# Patient Record
Sex: MALE | Race: BLACK OR AFRICAN AMERICAN | NOT HISPANIC OR LATINO | Employment: FULL TIME | URBAN - METROPOLITAN AREA
[De-identification: names, ages, dates, MRNs, and addresses within clinical notes are randomized per-mention and may not be internally consistent; named-entity substitution may affect disease eponyms.]

---

## 2019-12-23 ENCOUNTER — HOSPITAL ENCOUNTER (EMERGENCY)
Facility: HOSPITAL | Age: 39
Discharge: HOME | End: 2019-12-23
Attending: EMERGENCY MEDICINE
Payer: MEDICAID

## 2019-12-23 VITALS
BODY MASS INDEX: 20.3 KG/M2 | SYSTOLIC BLOOD PRESSURE: 127 MMHG | HEIGHT: 71 IN | RESPIRATION RATE: 19 BRPM | TEMPERATURE: 98 F | HEART RATE: 80 BPM | WEIGHT: 145 LBS | DIASTOLIC BLOOD PRESSURE: 80 MMHG | OXYGEN SATURATION: 97 %

## 2019-12-23 DIAGNOSIS — R11.2 NAUSEA AND VOMITING: ICD-10-CM

## 2019-12-23 DIAGNOSIS — R11.2 NON-INTRACTABLE VOMITING WITH NAUSEA, UNSPECIFIED VOMITING TYPE: Primary | ICD-10-CM

## 2019-12-23 DIAGNOSIS — R10.84 GENERALIZED ABDOMINAL PAIN: ICD-10-CM

## 2019-12-23 LAB
ALBUMIN SERPL BCP-MCNC: 4.1 G/DL (ref 3.5–5.2)
ALP SERPL-CCNC: 98 U/L (ref 55–135)
ALT SERPL W/O P-5'-P-CCNC: 20 U/L (ref 10–44)
ANION GAP SERPL CALC-SCNC: 12 MMOL/L (ref 8–16)
AST SERPL-CCNC: 26 U/L (ref 10–40)
BACTERIA #/AREA URNS HPF: NORMAL /HPF
BASOPHILS # BLD AUTO: 0.05 K/UL (ref 0–0.2)
BASOPHILS NFR BLD: 0.6 % (ref 0–1.9)
BILIRUB SERPL-MCNC: 1.8 MG/DL (ref 0.1–1)
BILIRUB UR QL STRIP: NEGATIVE
BUN SERPL-MCNC: 8 MG/DL (ref 6–20)
CALCIUM SERPL-MCNC: 9.4 MG/DL (ref 8.7–10.5)
CHLORIDE SERPL-SCNC: 105 MMOL/L (ref 95–110)
CLARITY UR: CLEAR
CO2 SERPL-SCNC: 26 MMOL/L (ref 23–29)
COLOR UR: YELLOW
CREAT SERPL-MCNC: 0.8 MG/DL (ref 0.5–1.4)
DIFFERENTIAL METHOD: ABNORMAL
EOSINOPHIL # BLD AUTO: 0 K/UL (ref 0–0.5)
EOSINOPHIL NFR BLD: 0.2 % (ref 0–8)
ERYTHROCYTE [DISTWIDTH] IN BLOOD BY AUTOMATED COUNT: 12.4 % (ref 11.5–14.5)
EST. GFR  (AFRICAN AMERICAN): >60 ML/MIN/1.73 M^2
EST. GFR  (NON AFRICAN AMERICAN): >60 ML/MIN/1.73 M^2
GLUCOSE SERPL-MCNC: 84 MG/DL (ref 70–110)
GLUCOSE UR QL STRIP: NEGATIVE
HCT VFR BLD AUTO: 45.3 % (ref 40–54)
HGB BLD-MCNC: 15.2 G/DL (ref 14–18)
HGB UR QL STRIP: NEGATIVE
HYALINE CASTS #/AREA URNS LPF: NORMAL /LPF
IMM GRANULOCYTES # BLD AUTO: 0.02 K/UL (ref 0–0.04)
IMM GRANULOCYTES NFR BLD AUTO: 0.2 % (ref 0–0.5)
KETONES UR QL STRIP: ABNORMAL
LEUKOCYTE ESTERASE UR QL STRIP: NEGATIVE
LIPASE SERPL-CCNC: 23 U/L (ref 4–60)
LYMPHOCYTES # BLD AUTO: 2.7 K/UL (ref 1–4.8)
LYMPHOCYTES NFR BLD: 33.3 % (ref 18–48)
MCH RBC QN AUTO: 32.1 PG (ref 27–31)
MCHC RBC AUTO-ENTMCNC: 33.6 G/DL (ref 32–36)
MCV RBC AUTO: 96 FL (ref 82–98)
MICROSCOPIC COMMENT: NORMAL
MONOCYTES # BLD AUTO: 0.8 K/UL (ref 0.3–1)
MONOCYTES NFR BLD: 9.7 % (ref 4–15)
NEUTROPHILS # BLD AUTO: 4.5 K/UL (ref 1.8–7.7)
NEUTROPHILS NFR BLD: 56 % (ref 38–73)
NITRITE UR QL STRIP: NEGATIVE
NRBC BLD-RTO: 0 /100 WBC
PH UR STRIP: 6 [PH] (ref 5–8)
PLATELET # BLD AUTO: 344 K/UL (ref 150–350)
PMV BLD AUTO: 8.4 FL (ref 9.2–12.9)
POTASSIUM SERPL-SCNC: 3.6 MMOL/L (ref 3.5–5.1)
PROT SERPL-MCNC: 7.5 G/DL (ref 6–8.4)
PROT UR QL STRIP: ABNORMAL
RBC # BLD AUTO: 4.74 M/UL (ref 4.6–6.2)
RBC #/AREA URNS HPF: 2 /HPF (ref 0–4)
SODIUM SERPL-SCNC: 143 MMOL/L (ref 136–145)
SP GR UR STRIP: 1.02 (ref 1–1.03)
SQUAMOUS #/AREA URNS HPF: NORMAL /HPF
URN SPEC COLLECT METH UR: ABNORMAL
UROBILINOGEN UR STRIP-ACNC: ABNORMAL EU/DL
WBC # BLD AUTO: 8.07 K/UL (ref 3.9–12.7)
WBC #/AREA URNS HPF: 2 /HPF (ref 0–5)

## 2019-12-23 PROCEDURE — 93010 ELECTROCARDIOGRAM REPORT: CPT | Mod: ,,, | Performed by: INTERNAL MEDICINE

## 2019-12-23 PROCEDURE — 93010 EKG 12-LEAD: ICD-10-PCS | Mod: ,,, | Performed by: INTERNAL MEDICINE

## 2019-12-23 PROCEDURE — 81000 URINALYSIS NONAUTO W/SCOPE: CPT

## 2019-12-23 PROCEDURE — 93005 ELECTROCARDIOGRAM TRACING: CPT

## 2019-12-23 PROCEDURE — 96374 THER/PROPH/DIAG INJ IV PUSH: CPT

## 2019-12-23 PROCEDURE — 80053 COMPREHEN METABOLIC PANEL: CPT

## 2019-12-23 PROCEDURE — 96375 TX/PRO/DX INJ NEW DRUG ADDON: CPT

## 2019-12-23 PROCEDURE — 63600175 PHARM REV CODE 636 W HCPCS: Performed by: EMERGENCY MEDICINE

## 2019-12-23 PROCEDURE — 96361 HYDRATE IV INFUSION ADD-ON: CPT

## 2019-12-23 PROCEDURE — 83690 ASSAY OF LIPASE: CPT

## 2019-12-23 PROCEDURE — 99285 EMERGENCY DEPT VISIT HI MDM: CPT | Mod: 25

## 2019-12-23 PROCEDURE — 85025 COMPLETE CBC W/AUTO DIFF WBC: CPT

## 2019-12-23 RX ORDER — ONDANSETRON 4 MG/1
4 TABLET, FILM COATED ORAL EVERY 6 HOURS
Qty: 12 TABLET | Refills: 0 | OUTPATIENT
Start: 2019-12-23 | End: 2020-03-17

## 2019-12-23 RX ORDER — KETOROLAC TROMETHAMINE 30 MG/ML
15 INJECTION, SOLUTION INTRAMUSCULAR; INTRAVENOUS
Status: COMPLETED | OUTPATIENT
Start: 2019-12-23 | End: 2019-12-23

## 2019-12-23 RX ORDER — ONDANSETRON 2 MG/ML
4 INJECTION INTRAMUSCULAR; INTRAVENOUS
Status: COMPLETED | OUTPATIENT
Start: 2019-12-23 | End: 2019-12-23

## 2019-12-23 RX ADMIN — KETOROLAC TROMETHAMINE 15 MG: 30 INJECTION, SOLUTION INTRAMUSCULAR at 07:12

## 2019-12-23 RX ADMIN — SODIUM CHLORIDE 1000 ML: 0.9 INJECTION, SOLUTION INTRAVENOUS at 07:12

## 2019-12-23 RX ADMIN — ONDANSETRON HYDROCHLORIDE 4 MG: 2 SOLUTION INTRAMUSCULAR; INTRAVENOUS at 07:12

## 2019-12-24 NOTE — ED PROVIDER NOTES
Encounter Date: 12/23/2019    SCRIBE #1 NOTE: I, Antelmo Fountain , am scribing for, and in the presence of,  Joe Saldaña MD. I have scribed the following portions of the note - Other sections scribed: HPI, ROS, PE.       History     Chief Complaint   Patient presents with    Emesis     pt c/o N/V and generalized abd pain that started around 3 pm today; reports 10+ episodes of vomiting    Abdominal Pain     This is a 39 year old male who presents to the Emergency Department with a cc of emesis that began four hours pta.  Pt reports having over ten episodes of vomiting.  Pt reports associated nausea, shortness of breath, dizziness, and generalized abdominal pain.  He denies chest pain and fever.  Pt states that he recently was evaluated by a pulmonologist, but is unsure of what he was diagnosed with.  Pt notes that he did not take any medications to alleviate his symptoms.  He denies being in sick contact.  Pt also denies abdominal surgical hx.    The history is provided by the patient and a relative.     Review of patient's allergies indicates:  No Known Allergies  Past Medical History:   Diagnosis Date    Bronchitis     GERD (gastroesophageal reflux disease)     Irregular heart beat      No past surgical history on file.  No family history on file.  Social History     Tobacco Use    Smoking status: Current Every Day Smoker     Types: Cigarettes   Substance Use Topics    Alcohol use: Yes     Comment: socially     Drug use: Yes     Types: Marijuana     Review of Systems   Constitutional: Negative for fever.   HENT: Negative for congestion and rhinorrhea.    Eyes: Negative for redness.   Respiratory: Positive for shortness of breath.    Cardiovascular: Negative for chest pain.   Gastrointestinal: Positive for abdominal pain, nausea and vomiting.   Genitourinary: Negative for dysuria and hematuria.   Musculoskeletal: Negative for back pain and neck pain.   Skin: Negative for rash.   Neurological: Positive  for dizziness.   Psychiatric/Behavioral: Negative for confusion.       Physical Exam     Initial Vitals [12/23/19 1847]   BP Pulse Resp Temp SpO2   123/77 75 20 97.7 °F (36.5 °C) 97 %      MAP       --         Physical Exam    Nursing note and vitals reviewed.  Constitutional: He appears well-developed and well-nourished. He does not appear ill. He appears distressed.   HENT:   Head: Normocephalic and atraumatic.   Mouth/Throat: Oropharynx is clear and moist.   Eyes: EOM are normal. Pupils are equal, round, and reactive to light. No scleral icterus.   Neck: Normal range of motion. Neck supple. No JVD present.   Cardiovascular: Normal rate, regular rhythm, normal heart sounds and intact distal pulses. Exam reveals no gallop.    No murmur heard.  Pulmonary/Chest: Breath sounds normal. No respiratory distress. He has no wheezes. He has no rales.   Abdominal: Soft. Bowel sounds are normal. There is tenderness (mild, genralized ).   Musculoskeletal: Normal range of motion. He exhibits no tenderness.   Neurological: He is alert and oriented to person, place, and time. He has normal strength. GCS score is 15. GCS eye subscore is 4. GCS verbal subscore is 5. GCS motor subscore is 6.   Skin: Skin is warm and dry. Capillary refill takes less than 2 seconds. No rash noted. No erythema. No pallor.   Psychiatric: His behavior is normal. Judgment and thought content normal.         ED Course   Procedures  Labs Reviewed   CBC W/ AUTO DIFFERENTIAL - Abnormal; Notable for the following components:       Result Value    Mean Corpuscular Hemoglobin 32.1 (*)     MPV 8.4 (*)     All other components within normal limits   COMPREHENSIVE METABOLIC PANEL - Abnormal; Notable for the following components:    Total Bilirubin 1.8 (*)     All other components within normal limits   URINALYSIS, REFLEX TO URINE CULTURE - Abnormal; Notable for the following components:    Protein, UA 1+ (*)     Ketones, UA Trace (*)     Urobilinogen, UA 4.0-6.0  (*)     All other components within normal limits    Narrative:     Preferred Collection Type->Urine, Clean Catch   LIPASE   URINALYSIS MICROSCOPIC    Narrative:     Preferred Collection Type->Urine, Clean Catch     EKG Readings: (Independently Interpreted)   Initial Reading: No STEMI. Rhythm: Normal Sinus Rhythm. Heart Rate: 76. Ectopy: No Ectopy. Conduction: Normal. ST Segments: Normal ST Segments. Clinical Impression: Normal Sinus Rhythm     ECG Results          EKG 12-lead (In process)  Result time 12/24/19 05:50:48    In process by Interface, [facility] (12/24/19 05:50:48)                 Narrative:    Test Reason : R11.2,    Vent. Rate : 076 BPM     Atrial Rate : 076 BPM     P-R Int : 118 ms          QRS Dur : 106 ms      QT Int : 378 ms       P-R-T Axes : 076 085 044 degrees     QTc Int : 425 ms    Sinus rhythm with marked sinus arrythmia  Biatrial enlargement  LVH  Abnormal ECG  When compared with ECG of 04-DEC-2016 02:17,  Significant changes have occurred    Referred By: [email or web address]   SELF           Confirmed By:                   In process by Interface, [facility] (12/24/19 05:47:30)                 Narrative:    Test Reason : R11.2,    Vent. Rate : 076 BPM     Atrial Rate : 076 BPM     P-R Int : 118 ms          QRS Dur : 106 ms      QT Int : 378 ms       P-R-T Axes : 076 085 044 degrees     QTc Int : 425 ms    Sinus rhythm with marked sinus arrythmia  Biatrial enlargement  LVH  Abnormal ECG  When compared with ECG of 04-DEC-2016 02:17,  Significant changes have occurred    Referred By: [email or web address]   SELF           Confirmed By:                             Imaging Results          X-Ray Chest AP Portable (Final result)  Result time 12/23/19 19:33:44    Final result by Nitish Rogers MD (12/23/19 19:33:44)                 Impression:      No acute cardiopulmonary process identified.      Electronically signed by: Nitish Rogers MD  Date:    12/23/2019  Time:    19:33             Narrative:     EXAMINATION:  XR CHEST AP PORTABLE    CLINICAL HISTORY:  shortness of breath;    TECHNIQUE:  Single frontal view of the chest was performed.    COMPARISON:  April 2014.    FINDINGS:  Cardiac silhouette is normal in size.  Lungs are symmetrically expanded.  No evidence of focal consolidative process, pneumothorax, or significant effusion.  No acute osseous abnormality identified.                                 Medical Decision Making:   Initial Assessment:   Dillon Wilhelm III is a 39 y.o. male presenting for an emergent evaluation of emesis and abdominal pain that began four hours pta. Exam revealed generalized abdominal pain. Will obtain labs, and given the extent of pain, imaging. I will treat the pt's symptoms appropriately and reassess.  Clinical Tests:   Lab Tests: Ordered and Reviewed  Radiological Study: Reviewed and Ordered  Medical Tests: Ordered and Reviewed            Scribe Attestation:   Scribe #1: I performed the above scribed service and the documentation accurately describes the services I performed. I attest to the accuracy of the note.      Pt arrived alert, afebrile, non-toxic in appearance, in no acute respiratory distress with VSS.  EKG revealed no acute findings concerning for ischemia, arrhythmia, heart block or any pathology to warrant cardiology consultation.  Abdomen minimally tender with no findings to warrant imaging at this time.  Labs showed no acute findings.  Sx's resolved with meds.  Abdomen soft and non-tender upon reassessment and the patient tolerated PO intake w/o difficulty.  Sx's likely secondary to gastritis vs gastroenteritis vs cyclical vomiting.  Pt discharged and counseled on the need to return to the nearest emergency room if they experience any other concerning symptoms.  Pt counseled to F/U outpatient with a PCP over the next two to three days.    Joe Saldaña MD                            Clinical Impression:       ICD-10-CM ICD-9-CM   1. Non-intractable vomiting  with nausea, unspecified vomiting type R11.2 787.01   2. Nausea and vomiting R11.2 787.01   3. Generalized abdominal pain R10.84 789.07             I, Joe Saldaña, personally performed the services described in this documentation. All medical record entries made by the scribe were at my direction and in my presence.  I have reviewed the chart and agree that the record reflects my personal performance and is accurate and complete.                 Joe Saldaña MD  12/24/19 3673

## 2019-12-24 NOTE — ED TRIAGE NOTES
Pt arrived to the ED due to n/v and generalized abdominal pain that started today. Pt denies SOB/fever/chills

## 2020-03-17 ENCOUNTER — HOSPITAL ENCOUNTER (EMERGENCY)
Facility: HOSPITAL | Age: 40
Discharge: HOME | End: 2020-03-17
Attending: EMERGENCY MEDICINE
Payer: MEDICAID

## 2020-03-17 VITALS
RESPIRATION RATE: 18 BRPM | HEIGHT: 69 IN | SYSTOLIC BLOOD PRESSURE: 129 MMHG | BODY MASS INDEX: 22.51 KG/M2 | DIASTOLIC BLOOD PRESSURE: 87 MMHG | TEMPERATURE: 99 F | HEART RATE: 66 BPM | OXYGEN SATURATION: 98 % | WEIGHT: 152 LBS

## 2020-03-17 DIAGNOSIS — J06.9 VIRAL URI WITH COUGH: Primary | ICD-10-CM

## 2020-03-17 LAB
ANION GAP SERPL CALC-SCNC: 12 MMOL/L (ref 8–16)
BUN SERPL-MCNC: 8 MG/DL (ref 6–30)
CHLORIDE SERPL-SCNC: 101 MMOL/L (ref 95–110)
CREAT SERPL-MCNC: 0.8 MG/DL (ref 0.5–1.4)
CTP QC/QA: YES
GLUCOSE SERPL-MCNC: 102 MG/DL (ref 70–110)
HCT VFR BLD CALC: 45 %PCV (ref 36–54)
POC IONIZED CALCIUM: 1.15 MMOL/L (ref 1.06–1.42)
POC MOLECULAR INFLUENZA A AGN: NEGATIVE
POC MOLECULAR INFLUENZA B AGN: NEGATIVE
POC TCO2 (MEASURED): 31 MMOL/L (ref 23–29)
POTASSIUM BLD-SCNC: 4.6 MMOL/L (ref 3.5–5.1)
SAMPLE: ABNORMAL
SODIUM BLD-SCNC: 138 MMOL/L (ref 136–145)

## 2020-03-17 PROCEDURE — 84295 ASSAY OF SERUM SODIUM: CPT

## 2020-03-17 PROCEDURE — 63600175 PHARM REV CODE 636 W HCPCS: Performed by: EMERGENCY MEDICINE

## 2020-03-17 PROCEDURE — 85014 HEMATOCRIT: CPT

## 2020-03-17 PROCEDURE — 84132 ASSAY OF SERUM POTASSIUM: CPT

## 2020-03-17 PROCEDURE — 82330 ASSAY OF CALCIUM: CPT

## 2020-03-17 PROCEDURE — 25000003 PHARM REV CODE 250: Performed by: EMERGENCY MEDICINE

## 2020-03-17 PROCEDURE — 96360 HYDRATION IV INFUSION INIT: CPT

## 2020-03-17 PROCEDURE — 87502 INFLUENZA DNA AMP PROBE: CPT

## 2020-03-17 PROCEDURE — 99284 EMERGENCY DEPT VISIT MOD MDM: CPT | Mod: 25

## 2020-03-17 PROCEDURE — 82565 ASSAY OF CREATININE: CPT

## 2020-03-17 PROCEDURE — 99900035 HC TECH TIME PER 15 MIN (STAT)

## 2020-03-17 RX ORDER — ONDANSETRON 4 MG/1
4 TABLET, ORALLY DISINTEGRATING ORAL
Status: COMPLETED | OUTPATIENT
Start: 2020-03-17 | End: 2020-03-17

## 2020-03-17 RX ORDER — BENZONATATE 100 MG/1
100 CAPSULE ORAL 3 TIMES DAILY PRN
Qty: 20 CAPSULE | Refills: 0 | Status: SHIPPED | OUTPATIENT
Start: 2020-03-17 | End: 2020-03-27

## 2020-03-17 RX ORDER — ONDANSETRON 4 MG/1
4 TABLET, FILM COATED ORAL EVERY 6 HOURS
Qty: 12 TABLET | Refills: 0 | Status: SHIPPED | OUTPATIENT
Start: 2020-03-17 | End: 2021-08-19 | Stop reason: HOSPADM

## 2020-03-17 RX ORDER — BENZONATATE 100 MG/1
100 CAPSULE ORAL 3 TIMES DAILY PRN
Status: DISCONTINUED | OUTPATIENT
Start: 2020-03-17 | End: 2020-03-17 | Stop reason: HOSPADM

## 2020-03-17 RX ADMIN — ONDANSETRON 4 MG: 4 TABLET, ORALLY DISINTEGRATING ORAL at 10:03

## 2020-03-17 RX ADMIN — BENZONATATE 100 MG: 100 CAPSULE ORAL at 10:03

## 2020-03-17 RX ADMIN — SODIUM CHLORIDE 1000 ML: 0.9 INJECTION, SOLUTION INTRAVENOUS at 10:03

## 2020-03-17 NOTE — ED PROVIDER NOTES
Encounter Date: 3/17/2020       History     Chief Complaint   Patient presents with    Cough     Pt to ER with c/o cough, nasal drip, abd cramps and chest discomfort while coughing x 2 days     HPI   39-year-old male with a history of bronchitis, GERD presents the ED with 5 days of rhinorrhea, cough, nausea vomiting.  He has not been able to really keep much food down since last Fridays.  He works at the [employer] center feels that he might have been exposed to the COVID19 virus.  No shortness of breath or fevers noted.      Review of patient's allergies indicates:  No Known Allergies  Past Medical History:   Diagnosis Date    Bronchitis     GERD (gastroesophageal reflux disease)     Irregular heart beat      History reviewed. No pertinent surgical history.  No family history on file.  Social History     Tobacco Use    Smoking status: Current Every Day Smoker     Types: Cigarettes   Substance Use Topics    Alcohol use: Yes     Comment: socially     Drug use: Yes     Types: Marijuana     Review of Systems   Constitutional: Negative for fever.   HENT: Negative for sore throat.    Respiratory: Positive for cough. Negative for chest tightness, shortness of breath, wheezing and stridor.    Cardiovascular: Negative for chest pain.   Gastrointestinal: Positive for nausea and vomiting. Negative for diarrhea.   Genitourinary: Negative for dysuria.   Musculoskeletal: Negative for back pain.   Skin: Negative for rash.   Neurological: Negative for weakness.   Hematological: Does not bruise/bleed easily.       Physical Exam     Initial Vitals [03/17/20 0938]   BP Pulse Resp Temp SpO2   121/85 64 18 98.5 °F (36.9 °C) 96 %      MAP       --         Physical Exam    Constitutional: He appears well-nourished.   HENT:   Head: Normocephalic.   Eyes: Conjunctivae are normal. No scleral icterus.   Neck: Normal range of motion. Neck supple.   Cardiovascular: Normal heart sounds and intact distal pulses.   No murmur  heard.  Pulmonary/Chest: Breath sounds normal. No respiratory distress. He has no wheezes. He has no rhonchi. He has no rales.   Abdominal: Soft. He exhibits no distension. There is no tenderness.   Musculoskeletal: Normal range of motion. He exhibits no edema.   Neurological: He is alert and oriented to person, place, and time.   Skin: Skin is warm and dry.   Psychiatric: He has a normal mood and affect. His behavior is normal. Judgment and thought content normal.         ED Course   Procedures  Labs Reviewed   ISTAT PROCEDURE - Abnormal; Notable for the following components:       Result Value    POC TCO2 (MEASURED) 31 (*)     All other components within normal limits   POCT INFLUENZA A/B MOLECULAR       [age]-year-old male with likely viral illness with GI and upper respiratory component.  He has no shortness of breath or difficulty breathing.  Zofran and Tessalon ordered but just after receiving medication patient vomited.  Chem 8, Zofran and fluids added.  Patient's labs were overall reassuring.  He feels better after fluids.  I have counseled him to aggressively hydrate and come back with any worsening of condition, increased shortness of breath, or any other concerns    Imaging Results    None                                          Clinical Impression:       ICD-10-CM ICD-9-CM   1. Viral URI with cough J06.9 465.9    B97.89              ED Disposition Condition    Discharge Stable        ED Prescriptions     Medication Sig Dispense Start Date End Date Auth. Provider    ondansetron (ZOFRAN) 4 MG tablet Take 1 tablet (4 mg total) by mouth every 6 (six) hours. 12 tablet 3/17/2020  Yanira Pimentel MD    benzonatate (TESSALON) 100 MG capsule Take 1 capsule (100 mg total) by mouth 3 (three) times daily as needed for Cough. 20 capsule 3/17/2020 3/27/2020 Yanira Pimentel MD        Follow-up Information     Follow up With Specialties Details Why Contact Info    [facility] [place]  In 1 week To  establish care [address] [place] [address]  [phone]      [facility] Go to  As needed, if symptoms worsen, or any other concerns [address] [facility] [address] [phone]                                     Yanira Pimentel MD  03/25/20 2117

## 2020-03-18 ENCOUNTER — PES CALL (OUTPATIENT)
Dept: ADMINISTRATIVE | Facility: CLINIC | Age: 40
End: 2020-03-18

## 2020-10-04 ENCOUNTER — HOSPITAL ENCOUNTER (EMERGENCY)
Facility: OTHER | Age: 40
Discharge: ANOTHER HOSPITAL | End: 2020-10-04
Attending: EMERGENCY MEDICINE
Payer: MEDICAID

## 2020-10-04 VITALS
TEMPERATURE: 98 F | WEIGHT: 150 LBS | RESPIRATION RATE: 16 BRPM | DIASTOLIC BLOOD PRESSURE: 67 MMHG | BODY MASS INDEX: 22.15 KG/M2 | OXYGEN SATURATION: 99 % | HEART RATE: 83 BPM | SYSTOLIC BLOOD PRESSURE: 142 MMHG

## 2020-10-04 DIAGNOSIS — S02.609B OPEN FRACTURE OF MANDIBLE, UNSPECIFIED LATERALITY, UNSPECIFIED MANDIBULAR SITE, INITIAL ENCOUNTER: Primary | ICD-10-CM

## 2020-10-04 LAB
HCV AB SERPL QL IA: NEGATIVE
HIV 1+2 AB+HIV1 P24 AG SERPL QL IA: NEGATIVE

## 2020-10-04 PROCEDURE — 99284 EMERGENCY DEPT VISIT MOD MDM: CPT | Mod: 25

## 2020-10-04 PROCEDURE — 86703 HIV-1/HIV-2 1 RESULT ANTBDY: CPT

## 2020-10-04 PROCEDURE — 86803 HEPATITIS C AB TEST: CPT

## 2020-10-04 PROCEDURE — 96375 TX/PRO/DX INJ NEW DRUG ADDON: CPT

## 2020-10-04 PROCEDURE — 96365 THER/PROPH/DIAG IV INF INIT: CPT

## 2020-10-04 PROCEDURE — 25000003 PHARM REV CODE 250: Performed by: PHYSICIAN ASSISTANT

## 2020-10-04 PROCEDURE — 63600175 PHARM REV CODE 636 W HCPCS: Performed by: PHYSICIAN ASSISTANT

## 2020-10-04 RX ORDER — CLINDAMYCIN PHOSPHATE 600 MG/50ML
600 INJECTION, SOLUTION INTRAVENOUS
Status: COMPLETED | OUTPATIENT
Start: 2020-10-04 | End: 2020-10-04

## 2020-10-04 RX ORDER — MORPHINE SULFATE 4 MG/ML
4 INJECTION, SOLUTION INTRAMUSCULAR; INTRAVENOUS
Status: COMPLETED | OUTPATIENT
Start: 2020-10-04 | End: 2020-10-04

## 2020-10-04 RX ORDER — FENTANYL CITRATE 50 UG/ML
75 INJECTION, SOLUTION INTRAMUSCULAR; INTRAVENOUS
Status: COMPLETED | OUTPATIENT
Start: 2020-10-04 | End: 2020-10-04

## 2020-10-04 RX ADMIN — FENTANYL CITRATE 75 MCG: 50 INJECTION INTRAMUSCULAR; INTRAVENOUS at 12:10

## 2020-10-04 RX ADMIN — CLINDAMYCIN IN 5 PERCENT DEXTROSE 600 MG: 12 INJECTION, SOLUTION INTRAVENOUS at 11:10

## 2020-10-04 RX ADMIN — MORPHINE SULFATE 4 MG: 4 INJECTION, SOLUTION INTRAMUSCULAR; INTRAVENOUS at 11:10

## 2020-10-04 NOTE — ED NOTES
Patient resting comfortably in bed.  Patient in NAD.  Respirations even, unlabored. Patient on continuous BP cuff, and pulse oximeter.  Patient denies needs/complaints at this time.
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
## 2020-10-04 NOTE — ED NOTES
Spoke with transfer center.  Patient will be transferred to [facility].  Providing MD is MD Bernard.  Phone number for report is [phone].  Need to send copy of chart and copy of images.
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
## 2021-04-08 ENCOUNTER — HOSPITAL ENCOUNTER (EMERGENCY)
Facility: OTHER | Age: 41
Discharge: HOME | End: 2021-04-08
Attending: EMERGENCY MEDICINE
Payer: MEDICAID

## 2021-04-08 VITALS
OXYGEN SATURATION: 97 % | DIASTOLIC BLOOD PRESSURE: 84 MMHG | RESPIRATION RATE: 18 BRPM | TEMPERATURE: 98 F | SYSTOLIC BLOOD PRESSURE: 123 MMHG | HEART RATE: 75 BPM

## 2021-04-08 DIAGNOSIS — S69.92XA WRIST INJURY, LEFT, INITIAL ENCOUNTER: ICD-10-CM

## 2021-04-08 DIAGNOSIS — S63.502A WRIST SPRAIN, LEFT, INITIAL ENCOUNTER: Primary | ICD-10-CM

## 2021-04-08 LAB
HCV AB SERPL QL IA: NEGATIVE
HIV 1+2 AB+HIV1 P24 AG SERPL QL IA: NEGATIVE

## 2021-04-08 PROCEDURE — 86803 HEPATITIS C AB TEST: CPT | Performed by: EMERGENCY MEDICINE

## 2021-04-08 PROCEDURE — 86703 HIV-1/HIV-2 1 RESULT ANTBDY: CPT | Performed by: EMERGENCY MEDICINE

## 2021-04-08 PROCEDURE — 25000003 PHARM REV CODE 250: Performed by: EMERGENCY MEDICINE

## 2021-04-08 PROCEDURE — 29125 APPL SHORT ARM SPLINT STATIC: CPT | Mod: LT

## 2021-04-08 PROCEDURE — 99284 EMERGENCY DEPT VISIT MOD MDM: CPT | Mod: 25

## 2021-04-08 RX ORDER — IBUPROFEN 600 MG/1
600 TABLET ORAL EVERY 6 HOURS PRN
Qty: 20 TABLET | Refills: 0 | Status: SHIPPED | OUTPATIENT
Start: 2021-04-08 | End: 2021-08-19 | Stop reason: SDUPTHER

## 2021-04-08 RX ORDER — IBUPROFEN 400 MG/1
800 TABLET ORAL
Status: COMPLETED | OUTPATIENT
Start: 2021-04-08 | End: 2021-04-08

## 2021-04-08 RX ADMIN — IBUPROFEN 800 MG: 400 TABLET, FILM COATED ORAL at 08:04

## 2021-04-26 ENCOUNTER — PATIENT MESSAGE (OUTPATIENT)
Dept: RESEARCH | Facility: HOSPITAL | Age: 41
End: 2021-04-26

## 2021-08-19 ENCOUNTER — HOSPITAL ENCOUNTER (EMERGENCY)
Facility: OTHER | Age: 41
Discharge: HOME | End: 2021-08-19
Attending: EMERGENCY MEDICINE
Payer: MEDICAID

## 2021-08-19 VITALS
BODY MASS INDEX: 21.7 KG/M2 | SYSTOLIC BLOOD PRESSURE: 132 MMHG | WEIGHT: 155 LBS | TEMPERATURE: 98 F | DIASTOLIC BLOOD PRESSURE: 82 MMHG | HEART RATE: 108 BPM | OXYGEN SATURATION: 96 % | HEIGHT: 71 IN | RESPIRATION RATE: 16 BRPM

## 2021-08-19 DIAGNOSIS — R07.9 CHEST PAIN: ICD-10-CM

## 2021-08-19 DIAGNOSIS — R07.89 CHEST WALL PAIN: Primary | ICD-10-CM

## 2021-08-19 LAB
ALBUMIN SERPL BCP-MCNC: 4 G/DL (ref 3.5–5.2)
ALP SERPL-CCNC: 92 U/L (ref 55–135)
ALT SERPL W/O P-5'-P-CCNC: 25 U/L (ref 10–44)
ANION GAP SERPL CALC-SCNC: 12 MMOL/L (ref 8–16)
AST SERPL-CCNC: 44 U/L (ref 10–40)
BASOPHILS # BLD AUTO: 0.06 K/UL (ref 0–0.2)
BASOPHILS NFR BLD: 0.7 % (ref 0–1.9)
BILIRUB SERPL-MCNC: 1.1 MG/DL (ref 0.1–1)
BUN SERPL-MCNC: 10 MG/DL (ref 6–20)
CALCIUM SERPL-MCNC: 8.8 MG/DL (ref 8.7–10.5)
CHLORIDE SERPL-SCNC: 106 MMOL/L (ref 95–110)
CO2 SERPL-SCNC: 22 MMOL/L (ref 23–29)
CREAT SERPL-MCNC: 0.8 MG/DL (ref 0.5–1.4)
CTP QC/QA: YES
D DIMER PPP IA.FEU-MCNC: 0.27 MG/L FEU
DIFFERENTIAL METHOD: ABNORMAL
EOSINOPHIL # BLD AUTO: 0.1 K/UL (ref 0–0.5)
EOSINOPHIL NFR BLD: 0.6 % (ref 0–8)
ERYTHROCYTE [DISTWIDTH] IN BLOOD BY AUTOMATED COUNT: 11.9 % (ref 11.5–14.5)
EST. GFR  (AFRICAN AMERICAN): >60 ML/MIN/1.73 M^2
EST. GFR  (NON AFRICAN AMERICAN): >60 ML/MIN/1.73 M^2
GLUCOSE SERPL-MCNC: 75 MG/DL (ref 70–110)
HCT VFR BLD AUTO: 39.4 % (ref 40–54)
HGB BLD-MCNC: 13.4 G/DL (ref 14–18)
IMM GRANULOCYTES # BLD AUTO: 0.01 K/UL (ref 0–0.04)
IMM GRANULOCYTES NFR BLD AUTO: 0.1 % (ref 0–0.5)
LYMPHOCYTES # BLD AUTO: 2.5 K/UL (ref 1–4.8)
LYMPHOCYTES NFR BLD: 30 % (ref 18–48)
MCH RBC QN AUTO: 33.8 PG (ref 27–31)
MCHC RBC AUTO-ENTMCNC: 34 G/DL (ref 32–36)
MCV RBC AUTO: 99 FL (ref 82–98)
MONOCYTES # BLD AUTO: 0.8 K/UL (ref 0.3–1)
MONOCYTES NFR BLD: 9.6 % (ref 4–15)
NEUTROPHILS # BLD AUTO: 5 K/UL (ref 1.8–7.7)
NEUTROPHILS NFR BLD: 59 % (ref 38–73)
NRBC BLD-RTO: 0 /100 WBC
PLATELET # BLD AUTO: 228 K/UL (ref 150–450)
PMV BLD AUTO: 8.4 FL (ref 9.2–12.9)
POCT GLUCOSE: 71 MG/DL (ref 70–110)
POTASSIUM SERPL-SCNC: 4.1 MMOL/L (ref 3.5–5.1)
PROT SERPL-MCNC: 7.7 G/DL (ref 6–8.4)
RBC # BLD AUTO: 3.97 M/UL (ref 4.6–6.2)
SARS-COV-2 RDRP RESP QL NAA+PROBE: NEGATIVE
SODIUM SERPL-SCNC: 140 MMOL/L (ref 136–145)
TROPONIN I SERPL DL<=0.01 NG/ML-MCNC: <0.006 NG/ML (ref 0–0.03)
WBC # BLD AUTO: 8.47 K/UL (ref 3.9–12.7)

## 2021-08-19 PROCEDURE — U0002 COVID-19 LAB TEST NON-CDC: HCPCS | Performed by: EMERGENCY MEDICINE

## 2021-08-19 PROCEDURE — 82962 GLUCOSE BLOOD TEST: CPT

## 2021-08-19 PROCEDURE — 63600175 PHARM REV CODE 636 W HCPCS: Performed by: EMERGENCY MEDICINE

## 2021-08-19 PROCEDURE — 80053 COMPREHEN METABOLIC PANEL: CPT | Performed by: EMERGENCY MEDICINE

## 2021-08-19 PROCEDURE — 84484 ASSAY OF TROPONIN QUANT: CPT | Performed by: EMERGENCY MEDICINE

## 2021-08-19 PROCEDURE — 99285 EMERGENCY DEPT VISIT HI MDM: CPT | Mod: 25

## 2021-08-19 PROCEDURE — 93005 ELECTROCARDIOGRAM TRACING: CPT

## 2021-08-19 PROCEDURE — 96374 THER/PROPH/DIAG INJ IV PUSH: CPT

## 2021-08-19 PROCEDURE — 93010 EKG 12-LEAD: ICD-10-PCS | Mod: ,,, | Performed by: INTERNAL MEDICINE

## 2021-08-19 PROCEDURE — 85025 COMPLETE CBC W/AUTO DIFF WBC: CPT | Performed by: EMERGENCY MEDICINE

## 2021-08-19 PROCEDURE — 85379 FIBRIN DEGRADATION QUANT: CPT | Performed by: EMERGENCY MEDICINE

## 2021-08-19 PROCEDURE — 93010 ELECTROCARDIOGRAM REPORT: CPT | Mod: ,,, | Performed by: INTERNAL MEDICINE

## 2021-08-19 PROCEDURE — 25000003 PHARM REV CODE 250: Performed by: EMERGENCY MEDICINE

## 2021-08-19 PROCEDURE — 96361 HYDRATE IV INFUSION ADD-ON: CPT

## 2021-08-19 RX ORDER — KETOROLAC TROMETHAMINE 30 MG/ML
15 INJECTION, SOLUTION INTRAMUSCULAR; INTRAVENOUS
Status: COMPLETED | OUTPATIENT
Start: 2021-08-19 | End: 2021-08-19

## 2021-08-19 RX ORDER — IBUPROFEN 600 MG/1
600 TABLET ORAL EVERY 6 HOURS PRN
Qty: 25 TABLET | Refills: 0 | OUTPATIENT
Start: 2021-08-19 | End: 2022-01-09

## 2021-08-19 RX ADMIN — KETOROLAC TROMETHAMINE 15 MG: 30 INJECTION, SOLUTION INTRAMUSCULAR; INTRAVENOUS at 08:08

## 2021-08-19 RX ADMIN — SODIUM CHLORIDE 1000 ML: 0.9 INJECTION, SOLUTION INTRAVENOUS at 08:08

## 2022-01-09 ENCOUNTER — HOSPITAL ENCOUNTER (EMERGENCY)
Facility: OTHER | Age: 42
Discharge: HOME | End: 2022-01-09
Attending: EMERGENCY MEDICINE
Payer: MEDICAID

## 2022-01-09 VITALS
OXYGEN SATURATION: 96 % | TEMPERATURE: 99 F | SYSTOLIC BLOOD PRESSURE: 121 MMHG | DIASTOLIC BLOOD PRESSURE: 74 MMHG | HEART RATE: 90 BPM | RESPIRATION RATE: 16 BRPM | HEIGHT: 71 IN | WEIGHT: 150 LBS | BODY MASS INDEX: 21 KG/M2

## 2022-01-09 DIAGNOSIS — S16.1XXA ACUTE STRAIN OF NECK MUSCLE, INITIAL ENCOUNTER: ICD-10-CM

## 2022-01-09 DIAGNOSIS — R11.2 NAUSEA AND VOMITING, INTRACTABILITY OF VOMITING NOT SPECIFIED, UNSPECIFIED VOMITING TYPE: Primary | ICD-10-CM

## 2022-01-09 DIAGNOSIS — B34.9 VIRAL SYNDROME: ICD-10-CM

## 2022-01-09 LAB
ALBUMIN SERPL BCP-MCNC: 4.1 G/DL (ref 3.5–5.2)
ALP SERPL-CCNC: 91 U/L (ref 55–135)
ALT SERPL W/O P-5'-P-CCNC: 23 U/L (ref 10–44)
ANION GAP SERPL CALC-SCNC: 20 MMOL/L (ref 8–16)
AST SERPL-CCNC: 43 U/L (ref 10–40)
BASOPHILS # BLD AUTO: 0.06 K/UL (ref 0–0.2)
BASOPHILS NFR BLD: 0.8 % (ref 0–1.9)
BILIRUB SERPL-MCNC: 1.1 MG/DL (ref 0.1–1)
BUN SERPL-MCNC: 13 MG/DL (ref 6–20)
CALCIUM SERPL-MCNC: 8.9 MG/DL (ref 8.7–10.5)
CHLORIDE SERPL-SCNC: 105 MMOL/L (ref 95–110)
CO2 SERPL-SCNC: 16 MMOL/L (ref 23–29)
CREAT SERPL-MCNC: 0.7 MG/DL (ref 0.5–1.4)
CTP QC/QA: YES
CTP QC/QA: YES
DIFFERENTIAL METHOD: ABNORMAL
EOSINOPHIL # BLD AUTO: 0 K/UL (ref 0–0.5)
EOSINOPHIL NFR BLD: 0.4 % (ref 0–8)
ERYTHROCYTE [DISTWIDTH] IN BLOOD BY AUTOMATED COUNT: 12 % (ref 11.5–14.5)
EST. GFR  (AFRICAN AMERICAN): >60 ML/MIN/1.73 M^2
EST. GFR  (NON AFRICAN AMERICAN): >60 ML/MIN/1.73 M^2
GLUCOSE SERPL-MCNC: 50 MG/DL (ref 70–110)
HCT VFR BLD AUTO: 41.8 % (ref 40–54)
HGB BLD-MCNC: 14 G/DL (ref 14–18)
IMM GRANULOCYTES # BLD AUTO: 0.02 K/UL (ref 0–0.04)
IMM GRANULOCYTES NFR BLD AUTO: 0.3 % (ref 0–0.5)
LYMPHOCYTES # BLD AUTO: 1.5 K/UL (ref 1–4.8)
LYMPHOCYTES NFR BLD: 19.4 % (ref 18–48)
MCH RBC QN AUTO: 33.7 PG (ref 27–31)
MCHC RBC AUTO-ENTMCNC: 33.5 G/DL (ref 32–36)
MCV RBC AUTO: 101 FL (ref 82–98)
MONOCYTES # BLD AUTO: 0.7 K/UL (ref 0.3–1)
MONOCYTES NFR BLD: 9.1 % (ref 4–15)
NEUTROPHILS # BLD AUTO: 5.6 K/UL (ref 1.8–7.7)
NEUTROPHILS NFR BLD: 70 % (ref 38–73)
NRBC BLD-RTO: 0 /100 WBC
PLATELET # BLD AUTO: 279 K/UL (ref 150–450)
PMV BLD AUTO: 8.6 FL (ref 9.2–12.9)
POC MOLECULAR INFLUENZA A AGN: NEGATIVE
POC MOLECULAR INFLUENZA B AGN: NEGATIVE
POTASSIUM SERPL-SCNC: 3.7 MMOL/L (ref 3.5–5.1)
PROT SERPL-MCNC: 7.3 G/DL (ref 6–8.4)
RBC # BLD AUTO: 4.15 M/UL (ref 4.6–6.2)
SARS-COV-2 RDRP RESP QL NAA+PROBE: NEGATIVE
SODIUM SERPL-SCNC: 141 MMOL/L (ref 136–145)
WBC # BLD AUTO: 7.94 K/UL (ref 3.9–12.7)

## 2022-01-09 PROCEDURE — 85025 COMPLETE CBC W/AUTO DIFF WBC: CPT | Performed by: NURSE PRACTITIONER

## 2022-01-09 PROCEDURE — 99284 PR EMERGENCY DEPT VISIT,LEVEL IV: ICD-10-PCS | Mod: CS,,, | Performed by: NURSE PRACTITIONER

## 2022-01-09 PROCEDURE — 80053 COMPREHEN METABOLIC PANEL: CPT | Performed by: NURSE PRACTITIONER

## 2022-01-09 PROCEDURE — 99284 EMERGENCY DEPT VISIT MOD MDM: CPT | Mod: 25

## 2022-01-09 PROCEDURE — 63600175 PHARM REV CODE 636 W HCPCS: Performed by: EMERGENCY MEDICINE

## 2022-01-09 PROCEDURE — 25000003 PHARM REV CODE 250: Performed by: NURSE PRACTITIONER

## 2022-01-09 PROCEDURE — U0002 COVID-19 LAB TEST NON-CDC: HCPCS | Performed by: NURSE PRACTITIONER

## 2022-01-09 PROCEDURE — 96374 THER/PROPH/DIAG INJ IV PUSH: CPT

## 2022-01-09 PROCEDURE — 99284 EMERGENCY DEPT VISIT MOD MDM: CPT | Mod: CS,,, | Performed by: NURSE PRACTITIONER

## 2022-01-09 PROCEDURE — 96361 HYDRATE IV INFUSION ADD-ON: CPT

## 2022-01-09 RX ORDER — BENZONATATE 100 MG/1
100 CAPSULE ORAL 3 TIMES DAILY PRN
Qty: 30 CAPSULE | Refills: 0 | Status: SHIPPED | OUTPATIENT
Start: 2022-01-09 | End: 2022-01-19

## 2022-01-09 RX ORDER — NAPROXEN 500 MG/1
500 TABLET ORAL 2 TIMES DAILY WITH MEALS
Qty: 20 TABLET | Refills: 0 | OUTPATIENT
Start: 2022-01-09 | End: 2023-11-30

## 2022-01-09 RX ORDER — SODIUM CHLORIDE 9 MG/ML
INJECTION, SOLUTION INTRAVENOUS
Status: COMPLETED | OUTPATIENT
Start: 2022-01-09 | End: 2022-01-09

## 2022-01-09 RX ORDER — ONDANSETRON 4 MG/1
4 TABLET, ORALLY DISINTEGRATING ORAL EVERY 6 HOURS PRN
Qty: 20 TABLET | Refills: 0 | OUTPATIENT
Start: 2022-01-09 | End: 2023-03-27

## 2022-01-09 RX ORDER — PROMETHAZINE HYDROCHLORIDE 25 MG/1
25 TABLET ORAL EVERY 6 HOURS PRN
Qty: 25 TABLET | Refills: 0 | OUTPATIENT
Start: 2022-01-09 | End: 2023-03-27

## 2022-01-09 RX ORDER — ONDANSETRON 4 MG/1
4 TABLET, ORALLY DISINTEGRATING ORAL
Status: COMPLETED | OUTPATIENT
Start: 2022-01-09 | End: 2022-01-09

## 2022-01-09 RX ORDER — ONDANSETRON 2 MG/ML
4 INJECTION INTRAMUSCULAR; INTRAVENOUS
Status: COMPLETED | OUTPATIENT
Start: 2022-01-09 | End: 2022-01-09

## 2022-01-09 RX ADMIN — SODIUM CHLORIDE: 0.9 INJECTION, SOLUTION INTRAVENOUS at 03:01

## 2022-01-09 RX ADMIN — ONDANSETRON 4 MG: 2 INJECTION INTRAMUSCULAR; INTRAVENOUS at 03:01

## 2022-01-09 RX ADMIN — ONDANSETRON 4 MG: 4 TABLET, ORALLY DISINTEGRATING ORAL at 01:01

## 2022-01-09 NOTE — ED NOTES
Pt tolerated PO challenge well, denies any new episodes of emesis, ED provider aware. Will continue to monitor.

## 2022-01-09 NOTE — ED NOTES
PO challenge started, pt given 8oz of water, will reassess in approx. 20-30min. Pt instructed to call RN with any new episodes of vomiting. Pt verbalized understanding.

## 2022-01-09 NOTE — ED PROVIDER NOTES
CHIEF COMPLAINT:   Chief Complaint   Patient presents with    COVID-19 Concerns     Pt c.o bodyaches onset 2 week.  Pt states pain started in neck and shoulders but now is aching all over. AAO  x 3 nadn skin w.d        HISTORY OF PRESENT ILLNESS: Dillon Wilhelm III who is a 41 y.o. male with PMH of bronchitis, GERD and irregular heartbeat presents presents to the emergency department today with complaints of nausea, vomiting, headache, cough and body aches. He reports that his symptoms first started 1-2 weeks ago but cannot recall exactly when. They have progressively worsened over the past couple of days. He reports multiple episodes of emesis and is having an episode on evaluation. No objective fever but does endorse intermittent chills and constant fatigue/malaise. No significant SOB or chest pain at rest but states that he becomes short of breath with activity.    REVIEW OF SYSTEMS:  Constitutional: no fever, positive chills.  Eyes: No discharge. No pain.  HENT: no nasal congestion, no anosmia; No sore throat.   Cardiovascular: No chest pain, no palpitations.  Respiratory: Positive cough, no shortness of breath at rest  Gastrointestinal: Positive nausea and vomiting, no diarrhea, No abdominal pain.  Genitourinary: No hematuria, dysuria, urgency.  Musculoskeletal: No back pain.  Skin: No rashes, no lesions.  Neurological: Positive headache and fatigue.    Otherwise remaining ROS negative     ALLERGIES REVIEWED  MEDICATIONS REVIEWED  PMH/PSH/SOC/FH REVIEWED     The history is provided by the patient.    Nursing/Ancillary staff note reviewed.        PHYSICAL EXAM:  VS reviewed  Vitals:    01/09/22 1323   BP: 121/74   Pulse: 90   Resp: 16   Temp: 98.9 °F (37.2 °C)       General Appearance: The patient is alert and responsive to questions. No acute distress. Intermittent emesis during evaluation.  HEENT: Eyes: Pupils equal; Extra ocular movements intact. No drainage.   Neck:Neck is supple non-tender. No lymphadenopathy.  No stridor.   Respiratory: There are no retractions, lungs are clear to auscultation. No wheezing, no crackles. Chest wall nontender to palpation.   Cardiovascular: Regular rate and rhythm. No murmurs, rubs or gallops.  Gastrointestinal:  Abdomen is soft and tender to palpation throughout all 4 quadrants, No guarding, no rebound.  No pulsatile mass.   Neurological: Alert and oriented x 4. No focal weakness. Strength intact 5/5 bilaterally in upper and lower extremities.   Skin: Warm and dry, no rashes.  Musculoskeletal: Extremities are non-tender, non-swollen and have full range of motion.      Past Medical History:   Diagnosis Date    Bronchitis     GERD (gastroesophageal reflux disease)     Irregular heart beat          No past surgical history on file.      ED COURSE:     Patient presenting with general illness symptoms; appears well and nontoxic. Exam grossly unremarkable at this time.    DIFFERENTIAL DIAGNOSIS: After history and physical exam a differential diagnosis was considered, but was not limited to,   Sepsis, meningitis, otitis media/external, nasal polyp, bacterial sinusitis, allergic rhinitis, influenza, COVID19, bacterial/viral pharyngitis, bacterial/viral pneumonia.    ED management: Patient seen for a viral-like illness, therefore due to the most recent recommendations from our hospital administrations/infectious disease at this time, the patient will be swabbed for COVID 19. Rapid COVID is negative.    Patient given 4 mg Zofran ODT with intent to PO challenge after 30 mins. Patient with multiple episodes of emesis after.    Will order CBC and CMP. Will also order for IV and 1000 mL normal saline for hydration.    Patient care assumed by Dr. Navas and case discussed.      IMPRESSION  The encounter diagnosis was Nausea and vomiting, intractability of vomiting not specified, unspecified vomiting type. Strict instructions to follow up with primary care physician or reference provided for further  assessment and evaluation. Given instructions to return for any acute symptoms and verbalized understanding of this medical plan.                                   Nuha Morocho, DAKOTA  01/09/22 1500

## 2022-04-20 ENCOUNTER — HOSPITAL ENCOUNTER (EMERGENCY)
Facility: OTHER | Age: 42
Discharge: HOME | End: 2022-04-21
Attending: EMERGENCY MEDICINE
Payer: MEDICAID

## 2022-04-20 DIAGNOSIS — J02.9 ACUTE PHARYNGITIS, UNSPECIFIED ETIOLOGY: Primary | ICD-10-CM

## 2022-04-20 LAB — GROUP A STREP, MOLECULAR: NEGATIVE

## 2022-04-20 PROCEDURE — 99284 EMERGENCY DEPT VISIT MOD MDM: CPT | Mod: 25

## 2022-04-20 PROCEDURE — 63600175 PHARM REV CODE 636 W HCPCS: Performed by: EMERGENCY MEDICINE

## 2022-04-20 PROCEDURE — 87651 STREP A DNA AMP PROBE: CPT | Performed by: EMERGENCY MEDICINE

## 2022-04-20 PROCEDURE — 96372 THER/PROPH/DIAG INJ SC/IM: CPT | Performed by: EMERGENCY MEDICINE

## 2022-04-20 RX ORDER — DEXAMETHASONE SODIUM PHOSPHATE 4 MG/ML
8 INJECTION, SOLUTION INTRA-ARTICULAR; INTRALESIONAL; INTRAMUSCULAR; INTRAVENOUS; SOFT TISSUE
Status: COMPLETED | OUTPATIENT
Start: 2022-04-20 | End: 2022-04-20

## 2022-04-20 RX ORDER — KETOROLAC TROMETHAMINE 30 MG/ML
15 INJECTION, SOLUTION INTRAMUSCULAR; INTRAVENOUS
Status: COMPLETED | OUTPATIENT
Start: 2022-04-20 | End: 2022-04-20

## 2022-04-20 RX ADMIN — DEXAMETHASONE SODIUM PHOSPHATE 8 MG: 4 INJECTION INTRA-ARTICULAR; INTRALESIONAL; INTRAMUSCULAR; INTRAVENOUS; SOFT TISSUE at 10:04

## 2022-04-20 RX ADMIN — KETOROLAC TROMETHAMINE 15 MG: 30 INJECTION, SOLUTION INTRAMUSCULAR at 10:04

## 2022-04-21 VITALS
OXYGEN SATURATION: 97 % | HEIGHT: 71 IN | HEART RATE: 68 BPM | SYSTOLIC BLOOD PRESSURE: 118 MMHG | BODY MASS INDEX: 21.7 KG/M2 | DIASTOLIC BLOOD PRESSURE: 72 MMHG | WEIGHT: 155 LBS | RESPIRATION RATE: 18 BRPM | TEMPERATURE: 98 F

## 2022-04-21 NOTE — ED PROVIDER NOTES
"     Source of History:  Patient    Chief complaint:  Sore Throat (Pt presents to the ER with complaints of a sore throat and tongue with dry mouth for the past two weeks. Pt also reporting a nonproductive cough; states the phlegm gets stuck in his throat./)      HPI:  Dillon Wilhelm III is a 41 y.o. male presenting with sore throat x1 week.  Patient reports associated rhinorrhea and cough and denies any fever, chills or myalgias.  No known sick contacts.  Only intervention has been aleve x 1.      This is the extent to the patients complaints today here in the emergency department.    ROS: As per HPI and below:  Constitutional: No fever.  No chills.  Eyes: No visual changes.   ENT: +sore throat. No ear pain.  Urinary: No abnormal urination.  MSK: No back pain. No joint pain.   Integument: No rashes or lesions.    Review of patient's allergies indicates:  No Known Allergies    PMH:  As per HPI and below:  Past Medical History:   Diagnosis Date    Bronchitis     GERD (gastroesophageal reflux disease)     Irregular heart beat      No past surgical history on file.    Social History     Tobacco Use    Smoking status: Current Every Day Smoker     Types: Cigarettes   Substance Use Topics    Alcohol use: Yes     Comment: socially     Drug use: Yes     Types: Marijuana       Physical Exam:    /78 (BP Location: Left arm, Patient Position: Sitting)   Pulse 67   Temp 97.9 °F (36.6 °C) (Oral)   Resp 18   Ht 5' 11" (1.803 m)   Wt 70.3 kg (155 lb)   SpO2 97%   BMI 21.62 kg/m²   Nursing note and vital signs reviewed.  Constitutional: No acute distress.  Nontoxic  Eyes: No conjunctival injection. Extraocular muscles intact.  ENT: Normal phonation.  Erythema and bilateral edema of the posterior oropharynx without exudates or visible PTA.  Uvula midline.  Controlling secretions.    Musculoskeletal: Good range of motion all joints.  No deformities.  Neck supple.  No meningismus. Neurovascularly intact.  Skin: No rashes " seen.  Good turgor.  No abrasions.  No ecchymoses.  Psych:  Alert and oriented x 3.  Appropriate, conversant.        I decided to obtain the patient's medical records.  Summary of Medical Records:      MDM/ Differential Dx:   41 y.o. male presents well-appearing, afebrile and HD stable with pharyngitis.  Will check rapid strep and give decadron and toradol.      ED Course as of 04/20/22 2347 Wed Apr 20, 2022 2347 Strep is negative.  Patient counseled supportive care for home and given indications for seeking re-evaluation. [AA]      ED Course User Index  [AA] Concetta Guerrero MD               Diagnostic Impression:    1. Acute pharyngitis, unspecified etiology         ED Disposition Condition    Discharge Stable          ED Prescriptions     None        Follow-up Information     Follow up With Specialties Details Why Contact Info    [facility] - Emergency Dept Emergency Medicine Go to  If symptoms worsen [address] [facility] [id number]  [phone]    Primary care  Schedule an appointment as soon as possible for a visit              Concetta Guerrero MD  04/20/22 2349

## 2022-04-21 NOTE — DISCHARGE INSTRUCTIONS
To help alleviate your sore throat he can gargle with salt water, use Chloraseptic spray or lozenges available over-the-counter.

## 2023-01-14 ENCOUNTER — HOSPITAL ENCOUNTER (EMERGENCY)
Facility: OTHER | Age: 43
Discharge: HOME | End: 2023-01-14
Attending: EMERGENCY MEDICINE
Payer: MEDICAID

## 2023-01-14 VITALS
RESPIRATION RATE: 18 BRPM | SYSTOLIC BLOOD PRESSURE: 159 MMHG | OXYGEN SATURATION: 97 % | WEIGHT: 150 LBS | DIASTOLIC BLOOD PRESSURE: 96 MMHG | TEMPERATURE: 98 F | HEART RATE: 70 BPM | BODY MASS INDEX: 20.92 KG/M2

## 2023-01-14 DIAGNOSIS — L02.91 ABSCESS: Primary | ICD-10-CM

## 2023-01-14 PROCEDURE — 10060 I&D ABSCESS SIMPLE/SINGLE: CPT

## 2023-01-14 PROCEDURE — 25000003 PHARM REV CODE 250: Performed by: NURSE PRACTITIONER

## 2023-01-14 PROCEDURE — 99283 EMERGENCY DEPT VISIT LOW MDM: CPT | Mod: 25

## 2023-01-14 RX ORDER — AMOXICILLIN AND CLAVULANATE POTASSIUM 875; 125 MG/1; MG/1
1 TABLET, FILM COATED ORAL 2 TIMES DAILY
Qty: 14 TABLET | Refills: 0 | OUTPATIENT
Start: 2023-01-14 | End: 2023-11-30

## 2023-01-14 RX ORDER — LIDOCAINE HYDROCHLORIDE 10 MG/ML
10 INJECTION INFILTRATION; PERINEURAL
Status: COMPLETED | OUTPATIENT
Start: 2023-01-14 | End: 2023-01-14

## 2023-01-14 RX ADMIN — LIDOCAINE HYDROCHLORIDE 10 ML: 10 INJECTION, SOLUTION INFILTRATION; PERINEURAL at 11:01

## 2023-01-14 NOTE — Clinical Note
"Dillon Chi"Choco was seen and treated in our emergency department on 1/14/2023.  He may return to work on 01/15/2023.       If you have any questions or concerns, please don't hesitate to call.      DAKOTA Bermeo"

## 2023-01-14 NOTE — ED NOTES
Patient presented to ER with c/o abscess to right inner buttocks x 1 week. Patient stated no drainage noted.      LOC: The patient is awake, alert, and oriented to self, place, time, and situation. Pt is calm and cooperative. Affect is appropriate.  Speech is appropriate and clear.     APPEARANCE: Patient resting comfortably in no acute distress.  Patient is clean and well groomed.    SKIN: The skin is warm and dry; color consistent with ethnicity.  Patient has normal skin turgor and moist mucus membranes.      MUSCULOSKELETAL: Patient moving upper and lower extremities without difficulty; denies pain in the extremities or back.  Denies weakness.     RESPIRATORY: Airway is open and patent. Respirations spontaneous, even, easy, and non-labored.  Patient has a normal effort and rate.  No accessory muscle use noted. Denies cough.     CARDIAC:  Normal rate noted.  No peripheral edema noted. No complaints of chest pain.      ABDOMEN: Soft and non tender to palpation.  No distention noted. Pt denies abdominal pain; denies nausea, vomiting, diarrhea, or constipation.    NEUROLOGIC: Eyes open spontaneously.  Behavior appropriate to situation.  Follows commands; facial expression symmetrical.  Purposeful motor response noted; normal sensation in all extremities. Pt denies headache; denies lightheadedness or dizziness; denies visual disturbances; denies loss of balance; denies unilateral weakness.

## 2023-01-15 NOTE — ED PROVIDER NOTES
Encounter Date: 1/14/2023       History     Chief Complaint   Patient presents with    Cyst     C/o cyst to buttock x 1 wk. Denies any drainage. VSS      43 y/o male which presents with a cyst to his right buttock that began a week ago. Pt denies pain with defecation or fevers.     The history is provided by the patient.   Review of patient's allergies indicates:  No Known Allergies  Past Medical History:   Diagnosis Date    Bronchitis     GERD (gastroesophageal reflux disease)     Irregular heart beat      History reviewed. No pertinent surgical history.  History reviewed. No pertinent family history.  Social History     Tobacco Use    Smoking status: Every Day     Types: Cigarettes   Substance Use Topics    Alcohol use: Yes     Comment: socially     Drug use: Yes     Types: Marijuana     Review of Systems   Constitutional:  Negative for fever.   HENT:  Negative for sore throat.    Respiratory:  Negative for shortness of breath.    Cardiovascular:  Negative for chest pain.   Gastrointestinal:  Negative for nausea.   Genitourinary:  Negative for dysuria.   Musculoskeletal:  Negative for back pain.   Skin:  Positive for color change. Negative for rash.   Neurological:  Negative for weakness.   Hematological:  Does not bruise/bleed easily.   All other systems reviewed and are negative.    Physical Exam     Initial Vitals [01/14/23 1011]   BP Pulse Resp Temp SpO2   (!) 142/93 93 17 98.6 °F (37 °C) 95 %      MAP       --         Physical Exam    Nursing note and vitals reviewed.  Constitutional: He appears well-developed and well-nourished.   HENT:   Head: Normocephalic and atraumatic.   Eyes: Conjunctivae and EOM are normal. Pupils are equal, round, and reactive to light.   Neck:   Normal range of motion.  Cardiovascular:  Normal rate, regular rhythm, normal heart sounds and intact distal pulses.     Exam reveals no gallop and no friction rub.       No murmur heard.  Pulmonary/Chest: Breath sounds normal. No  respiratory distress. He has no wheezes. He has no rhonchi. He has no rales. He exhibits no tenderness.   Abdominal: Abdomen is soft. Bowel sounds are normal. He exhibits no distension and no mass. There is no abdominal tenderness. There is no rebound and no guarding.   Musculoskeletal:         General: No tenderness or edema. Normal range of motion.      Cervical back: Normal range of motion.     Neurological: He is alert and oriented to person, place, and time. He has normal strength. GCS score is 15. GCS eye subscore is 4. GCS verbal subscore is 5. GCS motor subscore is 6.   Skin: Skin is warm. Capillary refill takes less than 2 seconds. Abscess noted.   Small abscess noted to right upper buttock near the gluteal cleft     ED Course   I & D - Incision and Drainage    Date/Time: 1/14/2023 11:30 AM  Location procedure was performed: [facility] EMERGENCY DEPARTMENT  Performed by: DAKOTA Bermeo  Authorized by: Colt Rocha MD   Pre-operative diagnosis: abscess  Post-operative diagnosis: abscess  Consent Done: Not Needed  Type: abscess  Body area: anogenital  Location details: gluteal cleft    Anesthesia:  Local Anesthetic: lidocaine 1% without epinephrine  Anesthetic total: 2 mL  Risk factor: coagulopathy  Scalpel size: 11  Incision type: single straight  Complexity: simple  Drainage: bloody and purulent  Drainage amount: moderate  Wound treatment: incision, drainage, deloculation, expression of material and wound packed  Packing material: 1/4 in gauze  Complications: No  Specimens: No  Implants: No  Patient tolerance: Patient tolerated the procedure well with no immediate complications      Labs Reviewed - No data to display        Medications   LIDOcaine HCL 10 mg/ml (1%) injection 10 mL (10 mLs Infiltration Given 1/14/23 1127)     Medical Decision Making:   Initial Assessment:   41 y/o male with a buttock/gluteal fold abscess.   Differential Diagnosis:   Abscess, perirectal abscess, cellulitis,  mary's  ED Management:  Pt examined and noted to have an abscess to his right buttock/gluteal fold. Abscess successfully drained and packed. Pt discharged with Augmentin and advised to remove the packing in 2 days and to return if there was purulent drainage. Patient given strict return precautions and voiced understanding of all discharge instructions. Pt was stable at discharge.                  ED Course as of 01/15/23 0931   Sat Jan 14, 2023   1048 BP(!): 142/93 [AT]   1048 Temp: 98.6 °F (37 °C) [AT]   1048 Temp src: Oral [AT]   1048 Pulse: 93 [AT]   1048 Resp: 17 [AT]   1048 SpO2: 95 % [AT]      ED Course User Index  [AT] DAKOTA Bermeo                 Clinical Impression:   Final diagnoses:  [L02.91] Abscess (Primary)        ED Disposition Condition    Discharge Stable          ED Prescriptions       Medication Sig Dispense Start Date End Date Auth. Provider    amoxicillin-clavulanate 875-125mg (AUGMENTIN) 875-125 mg per tablet Take 1 tablet by mouth 2 (two) times daily. 14 tablet 1/14/2023 -- DAKOTA Bermeo          Follow-up Information       Follow up With Specialties Details Why Contact Info    primary care provider as needed        [facility] - Emergency Dept Emergency Medicine  If symptoms worsen [address] [facility] [phone]             DAKOTA Bermeo  01/15/23 0931

## 2023-03-27 ENCOUNTER — HOSPITAL ENCOUNTER (EMERGENCY)
Facility: OTHER | Age: 43
Discharge: HOME | End: 2023-03-27
Attending: EMERGENCY MEDICINE
Payer: MEDICAID

## 2023-03-27 VITALS
TEMPERATURE: 98 F | HEART RATE: 69 BPM | BODY MASS INDEX: 22.12 KG/M2 | DIASTOLIC BLOOD PRESSURE: 79 MMHG | OXYGEN SATURATION: 98 % | HEIGHT: 71 IN | SYSTOLIC BLOOD PRESSURE: 130 MMHG | RESPIRATION RATE: 16 BRPM | WEIGHT: 158 LBS

## 2023-03-27 DIAGNOSIS — A08.4 VIRAL GASTROENTERITIS: Primary | ICD-10-CM

## 2023-03-27 LAB
ALBUMIN SERPL BCP-MCNC: 4.1 G/DL (ref 3.5–5.2)
ALP SERPL-CCNC: 92 U/L (ref 55–135)
ALT SERPL W/O P-5'-P-CCNC: 28 U/L (ref 10–44)
ANION GAP SERPL CALC-SCNC: 16 MMOL/L (ref 8–16)
AST SERPL-CCNC: 43 U/L (ref 10–40)
BASOPHILS # BLD AUTO: 0.04 K/UL (ref 0–0.2)
BASOPHILS NFR BLD: 0.4 % (ref 0–1.9)
BILIRUB SERPL-MCNC: 1.3 MG/DL (ref 0.1–1)
BUN SERPL-MCNC: 13 MG/DL (ref 6–20)
CALCIUM SERPL-MCNC: 9.4 MG/DL (ref 8.7–10.5)
CHLORIDE SERPL-SCNC: 100 MMOL/L (ref 95–110)
CO2 SERPL-SCNC: 24 MMOL/L (ref 23–29)
CREAT SERPL-MCNC: 0.8 MG/DL (ref 0.5–1.4)
CTP QC/QA: YES
CTP QC/QA: YES
DIFFERENTIAL METHOD: ABNORMAL
EOSINOPHIL # BLD AUTO: 0 K/UL (ref 0–0.5)
EOSINOPHIL NFR BLD: 0 % (ref 0–8)
ERYTHROCYTE [DISTWIDTH] IN BLOOD BY AUTOMATED COUNT: 11.9 % (ref 11.5–14.5)
EST. GFR  (NO RACE VARIABLE): >60 ML/MIN/1.73 M^2
GLUCOSE SERPL-MCNC: 80 MG/DL (ref 70–110)
HCT VFR BLD AUTO: 45.3 % (ref 40–54)
HGB BLD-MCNC: 15.1 G/DL (ref 14–18)
IMM GRANULOCYTES # BLD AUTO: 0.03 K/UL (ref 0–0.04)
IMM GRANULOCYTES NFR BLD AUTO: 0.3 % (ref 0–0.5)
LIPASE SERPL-CCNC: 20 U/L (ref 4–60)
LYMPHOCYTES # BLD AUTO: 1.6 K/UL (ref 1–4.8)
LYMPHOCYTES NFR BLD: 15.7 % (ref 18–48)
MCH RBC QN AUTO: 33.9 PG (ref 27–31)
MCHC RBC AUTO-ENTMCNC: 33.3 G/DL (ref 32–36)
MCV RBC AUTO: 102 FL (ref 82–98)
MONOCYTES # BLD AUTO: 0.6 K/UL (ref 0.3–1)
MONOCYTES NFR BLD: 5.8 % (ref 4–15)
NEUTROPHILS # BLD AUTO: 7.9 K/UL (ref 1.8–7.7)
NEUTROPHILS NFR BLD: 77.8 % (ref 38–73)
NRBC BLD-RTO: 0 /100 WBC
PLATELET # BLD AUTO: 283 K/UL (ref 150–450)
PMV BLD AUTO: 8.2 FL (ref 9.2–12.9)
POC MOLECULAR INFLUENZA A AGN: NEGATIVE
POC MOLECULAR INFLUENZA B AGN: NEGATIVE
POTASSIUM SERPL-SCNC: 4.4 MMOL/L (ref 3.5–5.1)
PROT SERPL-MCNC: 7.5 G/DL (ref 6–8.4)
RBC # BLD AUTO: 4.46 M/UL (ref 4.6–6.2)
SARS-COV-2 RDRP RESP QL NAA+PROBE: NEGATIVE
SODIUM SERPL-SCNC: 140 MMOL/L (ref 136–145)
WBC # BLD AUTO: 10.09 K/UL (ref 3.9–12.7)

## 2023-03-27 PROCEDURE — 63600175 PHARM REV CODE 636 W HCPCS: Performed by: NURSE PRACTITIONER

## 2023-03-27 PROCEDURE — 96374 THER/PROPH/DIAG INJ IV PUSH: CPT

## 2023-03-27 PROCEDURE — 99284 EMERGENCY DEPT VISIT MOD MDM: CPT | Mod: 25

## 2023-03-27 PROCEDURE — 83690 ASSAY OF LIPASE: CPT | Performed by: NURSE PRACTITIONER

## 2023-03-27 PROCEDURE — 25000003 PHARM REV CODE 250: Performed by: NURSE PRACTITIONER

## 2023-03-27 PROCEDURE — 85025 COMPLETE CBC W/AUTO DIFF WBC: CPT | Performed by: NURSE PRACTITIONER

## 2023-03-27 PROCEDURE — 80053 COMPREHEN METABOLIC PANEL: CPT | Performed by: NURSE PRACTITIONER

## 2023-03-27 RX ORDER — ONDANSETRON 4 MG/1
4 TABLET, ORALLY DISINTEGRATING ORAL DAILY
Qty: 5 TABLET | Refills: 0 | Status: SHIPPED | OUTPATIENT
Start: 2023-03-27 | End: 2023-04-01

## 2023-03-27 RX ORDER — ACETAMINOPHEN 325 MG/1
650 TABLET ORAL
Status: COMPLETED | OUTPATIENT
Start: 2023-03-27 | End: 2023-03-27

## 2023-03-27 RX ORDER — ONDANSETRON 2 MG/ML
4 INJECTION INTRAMUSCULAR; INTRAVENOUS
Status: COMPLETED | OUTPATIENT
Start: 2023-03-27 | End: 2023-03-27

## 2023-03-27 RX ADMIN — ACETAMINOPHEN 650 MG: 325 TABLET, FILM COATED ORAL at 07:03

## 2023-03-27 RX ADMIN — ONDANSETRON 4 MG: 2 INJECTION INTRAMUSCULAR; INTRAVENOUS at 07:03

## 2023-03-27 RX ADMIN — SODIUM CHLORIDE 1000 ML: 9 INJECTION, SOLUTION INTRAVENOUS at 07:03

## 2023-03-27 NOTE — FIRST PROVIDER EVALUATION
Emergency Department TeleTriage Encounter Note      CHIEF COMPLAINT    Chief Complaint   Patient presents with    Emesis     42 y.o.male to ED with c.o. nausea, vomiting, and diarrhea that started around 2200 yesterday. Patient also c.o. facial pain and headache, patient endorses chills and subjective fever. Patient denies chest pain endorses cough and shortness of breath.       VITAL SIGNS   Initial Vitals [03/27/23 1728]   BP Pulse Resp Temp SpO2   (!) 149/82 69 16 98.1 °F (36.7 °C) 98 %      MAP       --            ALLERGIES    Review of patient's allergies indicates:  No Known Allergies    PROVIDER TRIAGE NOTE  This is a teletriage evaluation of a 42 y.o. male presenting to the ED complaining of abd pain with n/v/d since yesterday. Reports subjective fever, headaches, and chills. No CP or SOB. Denies hematemesis and rectal bleeding. He has not been able to tolerate oral fluids today.     Pt appears ill but nontoxic.  Will start labs, IV fluids, zofran.    Initial orders will be placed and care will be transferred to an alternate provider when patient is roomed for a full evaluation. Any additional orders and the final disposition will be determined by that provider.         ORDERS  Labs Reviewed - No data to display    ED Orders (720h ago, onward)      Start Ordered     Status Ordering Provider    03/27/23 1745 03/27/23 1738  sodium chloride 0.9% bolus 1,000 mL 1,000 mL  Once         Ordered JOS ECHOLS N.    03/27/23 1745 03/27/23 1738  ondansetron injection 4 mg  ED 1 Time         Ordered JSO ECHOLS N.    03/27/23 1745 03/27/23 1738  acetaminophen tablet 650 mg  ED 1 Time         Ordered JOS ECHOLS N.    03/27/23 1739 03/27/23 1738  Vital signs  Every 2 hours         Ordered JOS ECHOLS N.    03/27/23 1739 03/27/23 1738  Diet NPO  Diet effective now         Ordered JOS ECHOLS N.    03/27/23 1739 03/27/23 1738  Insert peripheral IV  Once         Ordered  ONESIMO JOS N.    03/27/23 1739 03/27/23 1738  CBC W/ AUTO DIFFERENTIAL  STAT         Ordered JOS ECHOLS N.    03/27/23 1739 03/27/23 1738  Comp. Metabolic Panel  STAT         Ordered KVNGJOS THOMAS N.    03/27/23 1739 03/27/23 1738  POCT Venous Blood Gas Once  Once        Comments: This test should be used for VBGs.  If using this order for other tests (K, creatinine, HCT, PT/INR, lactate etc)  ONLY do so in the case of an emergency or rapid response.Notify Physician if: see parameters below.      Ordered JOS ECHOLS N.    03/27/23 1739 03/27/23 1738  Lipase  STAT         Ordered JOS ECHOLS N.    03/27/23 1739 03/27/23 1738  Urinalysis, Reflex to Urine Culture Urine, Clean Catch  STAT         Ordered JOS ECHOLS N.    03/27/23 1739 03/27/23 1738  POCT COVID-19 Rapid Screening  Once         Ordered JOS ECHOLS N.    03/27/23 1739 03/27/23 1738  POCT Influenza A/B Molecular  Once         Ordered JOS ECHOLS.              Virtual Visit Note: The provider triage portion of this emergency department evaluation and documentation was performed via [employer], a HIPAA-compliant telemedicine application, in concert with a tele-presenter in the room. A face to face patient evaluation with one of my colleagues will occur once the patient is placed in an emergency department room.      DISCLAIMER: This note was prepared with [employer] voice recognition transcription software. Garbled syntax, mangled pronouns, and other bizarre constructions may be attributed to that software system.

## 2023-03-27 NOTE — Clinical Note
"Dillon Chi"Choco was seen and treated in our emergency department on 3/27/2023.  He may return to work on 03/28/2023.       If you have any questions or concerns, please don't hesitate to call.      Alli Garcias PA-C"

## 2023-03-28 NOTE — ED PROVIDER NOTES
Encounter Date: 3/27/2023       History     Chief Complaint   Patient presents with    Emesis     42 y.o.male to ED with c.o. nausea, vomiting, and diarrhea that started around 2200 yesterday. Patient also c.o. facial pain and headache, patient endorses chills and subjective fever. Patient denies chest pain endorses cough and shortness of breath.     42-year-old male with history of GERD presents to the emergency department for evaluation of nausea and multiple episodes of vomiting that began around 10:00 p.m. yesterday.  He denies eating anything out of the ordinary.  He reports associated intermittent lower abdominal cramping and 1 episode of nonbloody diarrhea that began this morning.  He reports subjective fever, chills, headache, sinus pain, runny nose, cough, and shortness of breath with coughing only. No treatments at home prior to arrival.  No recent sick contacts.  He denies recent use of antibiotics.  He denies dizziness, congestion, sore throat, chest pain, or urinary symptoms.    The history is provided by the patient.   Review of patient's allergies indicates:  No Known Allergies  Past Medical History:   Diagnosis Date    Bronchitis     GERD (gastroesophageal reflux disease)     Irregular heart beat      No past surgical history on file.  No family history on file.  Social History     Tobacco Use    Smoking status: Every Day     Types: Cigarettes   Substance Use Topics    Alcohol use: Yes     Comment: socially     Drug use: Yes     Types: Marijuana     Review of Systems   Constitutional:  Positive for chills and fever (subjective).   HENT:  Positive for rhinorrhea and sinus pain. Negative for congestion and sore throat.    Respiratory:  Positive for cough and shortness of breath (with coughing only).    Cardiovascular:  Negative for chest pain.   Gastrointestinal:  Positive for abdominal pain (lower), diarrhea, nausea and vomiting. Negative for blood in stool.   Neurological:  Positive for headaches.  Negative for dizziness.     Physical Exam     Initial Vitals [03/27/23 1728]   BP Pulse Resp Temp SpO2   (!) 149/82 69 16 98.1 °F (36.7 °C) 98 %      MAP       --         Physical Exam    Nursing note and vitals reviewed.  Constitutional: He appears well-developed and well-nourished. No distress.   HENT:   Head: Normocephalic and atraumatic.   Mouth/Throat: Oropharynx is clear and moist.   Eyes: Conjunctivae are normal.   Neck: Neck supple.   Normal range of motion.  Cardiovascular:  Normal rate and regular rhythm.           Murmur heard.  Systolic murmur is present with a grade of 2/6.  Pulmonary/Chest: Breath sounds normal. No respiratory distress. He has no wheezes. He has no rhonchi. He has no rales.   Abdominal: Abdomen is soft. Bowel sounds are normal. He exhibits no distension and no mass.   No tenderness to palpation of the abdomen.  There is no rebound and no guarding.   Musculoskeletal:         General: Normal range of motion.      Cervical back: Normal range of motion and neck supple.     Neurological: He is alert and oriented to person, place, and time. He has normal strength.   Skin: Skin is warm and dry.   Psychiatric: He has a normal mood and affect. His behavior is normal. Judgment and thought content normal.       ED Course   Procedures  Labs Reviewed   CBC W/ AUTO DIFFERENTIAL - Abnormal; Notable for the following components:       Result Value    RBC 4.46 (*)      (*)     MCH 33.9 (*)     MPV 8.2 (*)     Gran # (ANC) 7.9 (*)     Gran % 77.8 (*)     Lymph % 15.7 (*)     All other components within normal limits   COMPREHENSIVE METABOLIC PANEL - Abnormal; Notable for the following components:    Total Bilirubin 1.3 (*)     AST 43 (*)     All other components within normal limits   LIPASE   SARS-COV-2 RDRP GENE   POCT INFLUENZA A/B MOLECULAR          Imaging Results    None          Medications   sodium chloride 0.9% bolus 1,000 mL 1,000 mL (1,000 mLs Intravenous New Bag 3/27/23 1944)    ondansetron injection 4 mg (4 mg Intravenous Given 3/27/23 1943)   acetaminophen tablet 650 mg (650 mg Oral Given 3/27/23 1942)     Medical Decision Making:   Initial Assessment:   Emergent evaluation of 42-year-old male with multiple episodes of nausea and vomiting, intermittent lower abdominal cramping, and one non bloody episode of diarrhea that began yesterday evening.  Also reports associated subjective fever, chills, headache, cough, cold symptoms, and SOB with coughing only.  No treatments prior to arrival.  On exam, patient nauseous with no abdominal tenderness to palpation.  Will give IV fluids, Zofran and reassess. Awaiting results of COVID, influenza, CBC, CMP, and lipase.   Clinical Tests:   Lab Tests: Reviewed  ED Management:  On review of labs, rapid COVID and influenza tests are negative. CBC, CMP, and lipase are unremarkable.     2040 On reassessment, patient feeling much better after receiving IV fluids, Zofran, and Tylenol. I updated the patient on his lab results. I explained to the patient that his symptoms are likely due to viral gastroenteritis. Recommended he continue to stay hydrated at home and to follow up with PCP for worsening or persistent symptoms. Repeat VSS. He verbalized agreement with this plan of care. Return to work note requested by the patient and was provided. He was given return precautions.     I discussed the case with my supervising physician.                                 Clinical Impression:   Final diagnoses:  [A08.4] Viral gastroenteritis (Primary)        ED Disposition Condition    Discharge Stable          ED Prescriptions       Medication Sig Dispense Start Date End Date Auth. Provider    ondansetron (ZOFRAN-ODT) 4 MG TbDL Take 1 tablet (4 mg total) by mouth once daily. for 5 days 5 tablet 3/27/2023 4/1/2023 Alli Garcias PA-C          Follow-up Information    None          Alli Garcias PA-C  03/28/23 2140       Alli Garcias PA-C  03/28/23 2141

## 2023-03-28 NOTE — DISCHARGE INSTRUCTIONS
Please pickup Zofran and take as needed for nausea. You can take Tylenol if you have a fever. You can take over the counter Mucinex or Robitussin for your cough.

## 2023-03-28 NOTE — ED TRIAGE NOTES
Pt presents to the ED with c/o n/v/d that started yesterday. Pt denies any fevers. Pt AAOx4, NAD noted.

## 2023-06-16 ENCOUNTER — HOSPITAL ENCOUNTER (OUTPATIENT)
Facility: OTHER | Age: 43
Discharge: HOME | End: 2023-06-17
Attending: EMERGENCY MEDICINE | Admitting: EMERGENCY MEDICINE
Payer: MEDICAID

## 2023-06-16 DIAGNOSIS — R07.89 MUSCULOSKELETAL CHEST PAIN: Primary | ICD-10-CM

## 2023-06-16 DIAGNOSIS — I27.20 PULMONARY HTN: ICD-10-CM

## 2023-06-16 DIAGNOSIS — R07.9 CHEST PAIN: ICD-10-CM

## 2023-06-16 DIAGNOSIS — R93.1 ABNORMAL ECHOCARDIOGRAM: ICD-10-CM

## 2023-06-16 LAB
ALBUMIN SERPL BCP-MCNC: 3.8 G/DL (ref 3.5–5.2)
ALP SERPL-CCNC: 82 U/L (ref 55–135)
ALT SERPL W/O P-5'-P-CCNC: 19 U/L (ref 10–44)
ANION GAP SERPL CALC-SCNC: 9 MMOL/L (ref 8–16)
AST SERPL-CCNC: 32 U/L (ref 10–40)
BASOPHILS # BLD AUTO: 0.04 K/UL (ref 0–0.2)
BASOPHILS NFR BLD: 0.7 % (ref 0–1.9)
BILIRUB SERPL-MCNC: 0.6 MG/DL (ref 0.1–1)
BNP SERPL-MCNC: <10 PG/ML (ref 0–99)
BUN SERPL-MCNC: 9 MG/DL (ref 6–20)
CALCIUM SERPL-MCNC: 8.9 MG/DL (ref 8.7–10.5)
CHLORIDE SERPL-SCNC: 107 MMOL/L (ref 95–110)
CO2 SERPL-SCNC: 22 MMOL/L (ref 23–29)
CREAT SERPL-MCNC: 0.8 MG/DL (ref 0.5–1.4)
D DIMER PPP IA.FEU-MCNC: <0.19 MG/L FEU
DIFFERENTIAL METHOD: ABNORMAL
EOSINOPHIL # BLD AUTO: 0.1 K/UL (ref 0–0.5)
EOSINOPHIL NFR BLD: 1.5 % (ref 0–8)
ERYTHROCYTE [DISTWIDTH] IN BLOOD BY AUTOMATED COUNT: 12.1 % (ref 11.5–14.5)
EST. GFR  (NO RACE VARIABLE): >60 ML/MIN/1.73 M^2
GLUCOSE SERPL-MCNC: 89 MG/DL (ref 70–110)
HCT VFR BLD AUTO: 43.4 % (ref 40–54)
HGB BLD-MCNC: 14.7 G/DL (ref 14–18)
IMM GRANULOCYTES # BLD AUTO: 0.02 K/UL (ref 0–0.04)
IMM GRANULOCYTES NFR BLD AUTO: 0.3 % (ref 0–0.5)
LIPASE SERPL-CCNC: 36 U/L (ref 4–60)
LYMPHOCYTES # BLD AUTO: 2.2 K/UL (ref 1–4.8)
LYMPHOCYTES NFR BLD: 36.5 % (ref 18–48)
MCH RBC QN AUTO: 34.6 PG (ref 27–31)
MCHC RBC AUTO-ENTMCNC: 33.9 G/DL (ref 32–36)
MCV RBC AUTO: 102 FL (ref 82–98)
MONOCYTES # BLD AUTO: 0.7 K/UL (ref 0.3–1)
MONOCYTES NFR BLD: 11.3 % (ref 4–15)
NEUTROPHILS # BLD AUTO: 3 K/UL (ref 1.8–7.7)
NEUTROPHILS NFR BLD: 49.7 % (ref 38–73)
NRBC BLD-RTO: 0 /100 WBC
PLATELET # BLD AUTO: 230 K/UL (ref 150–450)
PMV BLD AUTO: 8.4 FL (ref 9.2–12.9)
POTASSIUM SERPL-SCNC: 4.3 MMOL/L (ref 3.5–5.1)
PROT SERPL-MCNC: 7.4 G/DL (ref 6–8.4)
RBC # BLD AUTO: 4.25 M/UL (ref 4.6–6.2)
SODIUM SERPL-SCNC: 138 MMOL/L (ref 136–145)
TROPONIN I SERPL DL<=0.01 NG/ML-MCNC: 0.01 NG/ML (ref 0–0.03)
TROPONIN I SERPL DL<=0.01 NG/ML-MCNC: <0.006 NG/ML (ref 0–0.03)
TROPONIN I SERPL DL<=0.01 NG/ML-MCNC: <0.006 NG/ML (ref 0–0.03)
WBC # BLD AUTO: 6.08 K/UL (ref 3.9–12.7)

## 2023-06-16 PROCEDURE — 85025 COMPLETE CBC W/AUTO DIFF WBC: CPT | Performed by: EMERGENCY MEDICINE

## 2023-06-16 PROCEDURE — 84484 ASSAY OF TROPONIN QUANT: CPT | Mod: 91 | Performed by: PHYSICIAN ASSISTANT

## 2023-06-16 PROCEDURE — 93010 ELECTROCARDIOGRAM REPORT: CPT | Mod: 76,59,, | Performed by: INTERNAL MEDICINE

## 2023-06-16 PROCEDURE — 84484 ASSAY OF TROPONIN QUANT: CPT | Mod: 91 | Performed by: EMERGENCY MEDICINE

## 2023-06-16 PROCEDURE — 83690 ASSAY OF LIPASE: CPT | Performed by: PHYSICIAN ASSISTANT

## 2023-06-16 PROCEDURE — 63600175 PHARM REV CODE 636 W HCPCS: Performed by: EMERGENCY MEDICINE

## 2023-06-16 PROCEDURE — 93010 ELECTROCARDIOGRAM REPORT: CPT | Mod: ,,, | Performed by: INTERNAL MEDICINE

## 2023-06-16 PROCEDURE — 99214 OFFICE O/P EST MOD 30 MIN: CPT | Mod: ,,, | Performed by: INTERNAL MEDICINE

## 2023-06-16 PROCEDURE — 99214 PR OFFICE/OUTPT VISIT, EST, LEVL IV, 30-39 MIN: ICD-10-PCS | Mod: ,,, | Performed by: INTERNAL MEDICINE

## 2023-06-16 PROCEDURE — 93010 EKG 12-LEAD: ICD-10-PCS | Mod: ,,, | Performed by: INTERNAL MEDICINE

## 2023-06-16 PROCEDURE — 96375 TX/PRO/DX INJ NEW DRUG ADDON: CPT

## 2023-06-16 PROCEDURE — 63600175 PHARM REV CODE 636 W HCPCS: Performed by: PHYSICIAN ASSISTANT

## 2023-06-16 PROCEDURE — 99285 EMERGENCY DEPT VISIT HI MDM: CPT | Mod: 25

## 2023-06-16 PROCEDURE — 36415 COLL VENOUS BLD VENIPUNCTURE: CPT | Performed by: PHYSICIAN ASSISTANT

## 2023-06-16 PROCEDURE — G0378 HOSPITAL OBSERVATION PER HR: HCPCS

## 2023-06-16 PROCEDURE — 96374 THER/PROPH/DIAG INJ IV PUSH: CPT

## 2023-06-16 PROCEDURE — 93005 ELECTROCARDIOGRAM TRACING: CPT

## 2023-06-16 PROCEDURE — 96376 TX/PRO/DX INJ SAME DRUG ADON: CPT

## 2023-06-16 PROCEDURE — 80053 COMPREHEN METABOLIC PANEL: CPT | Performed by: EMERGENCY MEDICINE

## 2023-06-16 PROCEDURE — 85379 FIBRIN DEGRADATION QUANT: CPT | Performed by: EMERGENCY MEDICINE

## 2023-06-16 PROCEDURE — 25000003 PHARM REV CODE 250: Performed by: EMERGENCY MEDICINE

## 2023-06-16 PROCEDURE — 83880 ASSAY OF NATRIURETIC PEPTIDE: CPT | Performed by: EMERGENCY MEDICINE

## 2023-06-16 PROCEDURE — 36415 COLL VENOUS BLD VENIPUNCTURE: CPT | Performed by: EMERGENCY MEDICINE

## 2023-06-16 RX ORDER — NITROGLYCERIN 0.4 MG/1
0.4 TABLET SUBLINGUAL EVERY 5 MIN PRN
Status: DISCONTINUED | OUTPATIENT
Start: 2023-06-16 | End: 2023-06-17 | Stop reason: HOSPADM

## 2023-06-16 RX ORDER — ASPIRIN 325 MG
325 TABLET ORAL
Status: COMPLETED | OUTPATIENT
Start: 2023-06-16 | End: 2023-06-16

## 2023-06-16 RX ORDER — MORPHINE SULFATE 4 MG/ML
4 INJECTION, SOLUTION INTRAMUSCULAR; INTRAVENOUS
Status: COMPLETED | OUTPATIENT
Start: 2023-06-16 | End: 2023-06-16

## 2023-06-16 RX ORDER — ONDANSETRON 2 MG/ML
4 INJECTION INTRAMUSCULAR; INTRAVENOUS EVERY 6 HOURS PRN
Status: DISCONTINUED | OUTPATIENT
Start: 2023-06-16 | End: 2023-06-17 | Stop reason: HOSPADM

## 2023-06-16 RX ORDER — SODIUM CHLORIDE 0.9 % (FLUSH) 0.9 %
10 SYRINGE (ML) INJECTION
Status: DISCONTINUED | OUTPATIENT
Start: 2023-06-16 | End: 2023-06-17 | Stop reason: HOSPADM

## 2023-06-16 RX ORDER — ONDANSETRON 2 MG/ML
4 INJECTION INTRAMUSCULAR; INTRAVENOUS
Status: COMPLETED | OUTPATIENT
Start: 2023-06-16 | End: 2023-06-16

## 2023-06-16 RX ORDER — TALC
6 POWDER (GRAM) TOPICAL NIGHTLY PRN
Status: DISCONTINUED | OUTPATIENT
Start: 2023-06-16 | End: 2023-06-17 | Stop reason: HOSPADM

## 2023-06-16 RX ADMIN — ONDANSETRON 4 MG: 2 INJECTION INTRAMUSCULAR; INTRAVENOUS at 07:06

## 2023-06-16 RX ADMIN — ONDANSETRON 4 MG: 2 INJECTION INTRAMUSCULAR; INTRAVENOUS at 01:06

## 2023-06-16 RX ADMIN — MORPHINE SULFATE 4 MG: 4 INJECTION, SOLUTION INTRAMUSCULAR; INTRAVENOUS at 07:06

## 2023-06-16 RX ADMIN — ONDANSETRON 4 MG: 2 INJECTION INTRAMUSCULAR; INTRAVENOUS at 06:06

## 2023-06-16 RX ADMIN — ASPIRIN 325 MG ORAL TABLET 325 MG: 325 PILL ORAL at 07:06

## 2023-06-16 RX ADMIN — MORPHINE SULFATE 4 MG: 4 INJECTION, SOLUTION INTRAMUSCULAR; INTRAVENOUS at 10:06

## 2023-06-16 NOTE — ED TRIAGE NOTES
Pt presents to the ED with c/o non radiating CP that started this morning. Pt reports SOB and is a current smoker. Pt Denies any drug use. Pt AAOx4, NAD noted.

## 2023-06-16 NOTE — Clinical Note
Diagnosis: Chest pain [222498]   Future Attending Provider: LELA ALFRED [[id number]]   Is the patient being sent to ED Observation?: Yes   Admitting Provider:: LELA ALFRED [[id number]]

## 2023-06-16 NOTE — ED PROVIDER NOTES
Encounter Date: 6/16/2023    SCRIBE #1 NOTE: I, Tonya Blake, am scribing for, and in the presence of,  Tere Powers MD. I have scribed the following portions of the note - Other sections scribed: HPI, ROS, PE.     History     Chief Complaint   Patient presents with    Chest Pain     C/o continuous left chest pain 10/10 that radiates to left back and SOB that began this am. Denies any other complaints. VSS     Time seen by provider: 7:24 AM    This is a 42 y.o. male, with past medical history of bronchitis and GERD, who presents with complaint of continuous left chest pain 10/10 that radiates to the left back. Patient reports that 1 hour ago, he was getting dress for work when the pain began.  Pain worsens with inspiration.  He has associated shortness of breath. Patient denies any pain or discomfort before going to bed last night. He denies fever, nausea or vomiting.  Denies history of pain similar to this.  Social history includes everyday use of cigarettes.  Denies any drugs or alcohol.  Denies family history of cardiac disease.  This is the extent of the patient's complaints at this time.            Review of patient's allergies indicates:  No Known Allergies  Past Medical History:   Diagnosis Date    Bronchitis     GERD (gastroesophageal reflux disease)     Irregular heart beat      No past surgical history on file.  No family history on file.  Social History     Tobacco Use    Smoking status: Every Day     Types: Cigarettes   Substance Use Topics    Alcohol use: Yes     Comment: socially     Drug use: Yes     Types: Marijuana     Review of Systems   Constitutional:  Negative for chills and fever.   HENT:  Negative for congestion, drooling, rhinorrhea and voice change.    Eyes:  Negative for photophobia and visual disturbance.   Respiratory:  Positive for shortness of breath. Negative for chest tightness and wheezing.    Cardiovascular:  Positive for chest pain. Negative for palpitations and leg swelling.    Gastrointestinal:  Negative for abdominal pain, diarrhea, nausea and vomiting.   Genitourinary:  Negative for dysuria, flank pain, frequency, hematuria and urgency.   Musculoskeletal:  Negative for back pain, myalgias and neck pain.   Skin:  Negative for color change, rash and wound.   Neurological:  Negative for dizziness, syncope, weakness, numbness and headaches.   Psychiatric/Behavioral:  Negative for agitation and confusion.    All other systems reviewed and are negative.    Physical Exam     Initial Vitals [06/16/23 0702]   BP Pulse Resp Temp SpO2   133/80 87 18 97.8 °F (36.6 °C) 97 %      MAP       --         Physical Exam    Nursing note and vitals reviewed.  Constitutional: He appears well-developed and well-nourished.   HENT:   Head: Normocephalic and atraumatic.   Eyes: Conjunctivae are normal.   Neck: Neck supple.   Normal range of motion.  Cardiovascular:  Normal rate, regular rhythm and normal heart sounds.           Pulmonary/Chest: Breath sounds normal. No respiratory distress. He has no wheezes. He has no rales.   Abdominal: Abdomen is soft. Bowel sounds are normal. He exhibits no distension. There is no abdominal tenderness. There is no rebound.   Musculoskeletal:         General: No tenderness or edema. Normal range of motion.      Cervical back: Normal range of motion and neck supple.     Neurological: He is alert and oriented to person, place, and time.   Ambulatory with steady gait.   Skin: Skin is warm and dry. Capillary refill takes less than 2 seconds.       ED Course   Procedures  Labs Reviewed   COMPREHENSIVE METABOLIC PANEL - Abnormal; Notable for the following components:       Result Value    CO2 22 (*)     All other components within normal limits   CBC W/ AUTO DIFFERENTIAL - Abnormal; Notable for the following components:    RBC 4.25 (*)      (*)     MCH 34.6 (*)     MPV 8.4 (*)     All other components within normal limits   TROPONIN I   B-TYPE NATRIURETIC PEPTIDE   D DIMER,  "QUANTITATIVE   D DIMER, QUANTITATIVE   TROPONIN I     EKG Readings: (Independently Interpreted)   7:13AM:  Rate of 70.  Normal sinus rhythm.  Normal axis.  Normal intervals.  No ST or ischemic changes.    9:50AM:  Rate of 55.  Sinus bradycardia.  Normal axis.  Normal intervals.  No ST or ischemic changes.     Imaging Results              X-Ray Chest AP Portable (Final result)  Result time 06/16/23 08:33:07      Final result by Ulices Vanegas MD (06/16/23 08:33:07)                   Impression:      No acute cardiopulmonary finding identified on this single view.      Electronically signed by: Ulices Vanegas MD  Date:    06/16/2023  Time:    08:33               Narrative:    EXAMINATION:  XR CHEST AP PORTABLE    CLINICAL HISTORY:  Provided history is "  Chest pain, unspecified".    TECHNIQUE:  One view of the chest.    COMPARISON:  08/19/2021.    FINDINGS:  Cardiac wires overlie the chest.  Cardiomediastinal silhouette is not enlarged.  No focal consolidation.  No sizable pleural effusion.  No pneumothorax.                                    X-Rays:   Independently Interpreted Readings:   Chest X-Ray: Trachea midline.  No cardiomegaly.  No effusion, infiltrate, edema.   Medications   sodium chloride 0.9% flush 10 mL (has no administration in time range)   melatonin tablet 6 mg (has no administration in time range)   nitroGLYCERIN SL tablet 0.4 mg (has no administration in time range)   morphine injection 4 mg (4 mg Intravenous Given 6/16/23 0744)   ondansetron injection 4 mg (4 mg Intravenous Given 6/16/23 0744)   aspirin tablet 325 mg (325 mg Oral Given 6/16/23 0745)   morphine injection 4 mg (4 mg Intravenous Given 6/16/23 1054)     Medical Decision Making:   History:   Old Medical Records: I decided to obtain old medical records.  Old Records Summarized: other records and records from another hospital.  Initial Assessment:   7:24AM:  Patient is a 42-year-old male who presents to the emergency department with " chest pain, radiating to the back.  Patient appears well, nontoxic.  Will plan for labs, cardiac eval, will continue to follow and reassess.  Independently Interpreted Test(s):   I have ordered and independently interpreted X-rays - see prior notes.  I have ordered and independently interpreted EKG Reading(s) - see prior notes  Clinical Tests:   Lab Tests: Ordered and Reviewed  Radiological Study: Ordered and Reviewed  Medical Tests: Ordered and Reviewed  Other:   I have discussed this case with another health care provider.     11:04 AM:  Patient continues to have chest pain, despite morphine.  His cardiac eval is negative and his dimer is negative as well.  Given his persistent symptoms, will plan to admit the patient to the ED observation unit for further observation and management.    11:15AM:  I discussed pt with Adriana Taylor PA-C, who is agreeable to plan for admission to EDOU.  I updated the patient regarding the results along with plan for admission.  Patient agreeable to plan and all questions answered.       Scribe Attestation:   Scribe #1: I performed the above scribed service and the documentation accurately describes the services I performed. I attest to the accuracy of the note.            Physician Attestation for Scribe: I, Tere Powers, reviewed documentation as scribed in my presence, which is both accurate and complete.         Clinical Impression:   Final diagnoses:  [R07.9] Chest pain        ED Disposition Condition    Observation Stable                Tere Powers MD  06/16/23 9991

## 2023-06-17 ENCOUNTER — HOSPITAL ENCOUNTER (OUTPATIENT)
Dept: CARDIOLOGY | Facility: OTHER | Age: 43
Discharge: HOME | End: 2023-06-17
Attending: INTERNAL MEDICINE
Payer: MEDICAID

## 2023-06-17 VITALS
OXYGEN SATURATION: 100 % | HEART RATE: 86 BPM | RESPIRATION RATE: 17 BRPM | BODY MASS INDEX: 21.28 KG/M2 | WEIGHT: 152 LBS | DIASTOLIC BLOOD PRESSURE: 91 MMHG | SYSTOLIC BLOOD PRESSURE: 131 MMHG | TEMPERATURE: 98 F | HEIGHT: 71 IN

## 2023-06-17 VITALS — HEIGHT: 71 IN | BODY MASS INDEX: 21.28 KG/M2 | WEIGHT: 152 LBS

## 2023-06-17 LAB
ALBUMIN SERPL BCP-MCNC: 3.8 G/DL (ref 3.5–5.2)
ALP SERPL-CCNC: 86 U/L (ref 55–135)
ALT SERPL W/O P-5'-P-CCNC: 19 U/L (ref 10–44)
ANION GAP SERPL CALC-SCNC: 9 MMOL/L (ref 8–16)
AST SERPL-CCNC: 27 U/L (ref 10–40)
AV INDEX (PROSTH): 0.46
AV MEAN GRADIENT: 8 MMHG
AV PEAK GRADIENT: 17 MMHG
AV VALVE AREA: 1.87 CM2
AV VELOCITY RATIO: 0.41
BASOPHILS # BLD AUTO: 0.04 K/UL (ref 0–0.2)
BASOPHILS NFR BLD: 0.7 % (ref 0–1.9)
BILIRUB SERPL-MCNC: 1.5 MG/DL (ref 0.1–1)
BSA FOR ECHO PROCEDURE: 1.86 M2
BUN SERPL-MCNC: 8 MG/DL (ref 6–20)
CALCIUM SERPL-MCNC: 9.2 MG/DL (ref 8.7–10.5)
CHLORIDE SERPL-SCNC: 100 MMOL/L (ref 95–110)
CO2 SERPL-SCNC: 29 MMOL/L (ref 23–29)
CREAT SERPL-MCNC: 0.9 MG/DL (ref 0.5–1.4)
CV ECHO LV RWT: 0.27 CM
DIFFERENTIAL METHOD: ABNORMAL
DOP CALC AO PEAK VEL: 2.05 M/S
DOP CALC AO VTI: 46.6 CM
DOP CALC LVOT AREA: 4.1 CM2
DOP CALC LVOT DIAMETER: 2.28 CM
DOP CALC LVOT PEAK VEL: 0.85 M/S
DOP CALC LVOT STROKE VOLUME: 86.92 CM3
DOP CALCLVOT PEAK VEL VTI: 21.3 CM
E WAVE DECELERATION TIME: 182.62 MSEC
E/A RATIO: 1.9
E/E' RATIO: 7.09 M/S
ECHO LV POSTERIOR WALL: 0.67 CM (ref 0.6–1.1)
EJECTION FRACTION: 65 %
EOSINOPHIL # BLD AUTO: 0.1 K/UL (ref 0–0.5)
EOSINOPHIL NFR BLD: 1.3 % (ref 0–8)
ERYTHROCYTE [DISTWIDTH] IN BLOOD BY AUTOMATED COUNT: 12 % (ref 11.5–14.5)
EST. GFR  (NO RACE VARIABLE): >60 ML/MIN/1.73 M^2
FRACTIONAL SHORTENING: 31 % (ref 28–44)
GLUCOSE SERPL-MCNC: 98 MG/DL (ref 70–110)
HCT VFR BLD AUTO: 44.5 % (ref 40–54)
HGB BLD-MCNC: 14.9 G/DL (ref 14–18)
IMM GRANULOCYTES # BLD AUTO: 0.02 K/UL (ref 0–0.04)
IMM GRANULOCYTES NFR BLD AUTO: 0.3 % (ref 0–0.5)
INTERVENTRICULAR SEPTUM: 0.73 CM (ref 0.6–1.1)
IVC DIAMETER: 1.3 CM
IVRT: 71.36 MSEC
LA MAJOR: 4.62 CM
LA MINOR: 5.03 CM
LA WIDTH: 4.1 CM
LEFT ATRIUM SIZE: 3.81 CM
LEFT ATRIUM VOLUME INDEX MOD: 26.1 ML/M2
LEFT ATRIUM VOLUME INDEX: 34 ML/M2
LEFT ATRIUM VOLUME MOD: 49 CM3
LEFT ATRIUM VOLUME: 63.95 CM3
LEFT INTERNAL DIMENSION IN SYSTOLE: 3.45 CM (ref 2.1–4)
LEFT VENTRICLE DIASTOLIC VOLUME INDEX: 62.76 ML/M2
LEFT VENTRICLE DIASTOLIC VOLUME: 117.99 ML
LEFT VENTRICLE MASS INDEX: 61 G/M2
LEFT VENTRICLE SYSTOLIC VOLUME INDEX: 26.2 ML/M2
LEFT VENTRICLE SYSTOLIC VOLUME: 49.21 ML
LEFT VENTRICULAR INTERNAL DIMENSION IN DIASTOLE: 5 CM (ref 3.5–6)
LEFT VENTRICULAR MASS: 114.7 G
LV LATERAL E/E' RATIO: 5.57 M/S
LV SEPTAL E/E' RATIO: 9.75 M/S
LVOT MG: 1.5 MMHG
LVOT MV: 0.57 CM/S
LYMPHOCYTES # BLD AUTO: 2.1 K/UL (ref 1–4.8)
LYMPHOCYTES NFR BLD: 35.9 % (ref 18–48)
MCH RBC QN AUTO: 34.3 PG (ref 27–31)
MCHC RBC AUTO-ENTMCNC: 33.5 G/DL (ref 32–36)
MCV RBC AUTO: 103 FL (ref 82–98)
MONOCYTES # BLD AUTO: 0.6 K/UL (ref 0.3–1)
MONOCYTES NFR BLD: 10.1 % (ref 4–15)
MV PEAK A VEL: 0.41 M/S
MV PEAK E VEL: 0.78 M/S
MV STENOSIS PRESSURE HALF TIME: 52.96 MS
MV VALVE AREA P 1/2 METHOD: 4.15 CM2
NEUTROPHILS # BLD AUTO: 3.1 K/UL (ref 1.8–7.7)
NEUTROPHILS NFR BLD: 51.7 % (ref 38–73)
NRBC BLD-RTO: 0 /100 WBC
PISA TR MAX VEL: 3.28 M/S
PLATELET # BLD AUTO: 235 K/UL (ref 150–450)
PMV BLD AUTO: 8.4 FL (ref 9.2–12.9)
POTASSIUM SERPL-SCNC: 4 MMOL/L (ref 3.5–5.1)
PROT SERPL-MCNC: 7.2 G/DL (ref 6–8.4)
PULM VEIN S/D RATIO: 0.88
PV PEAK D VEL: 0.51 M/S
PV PEAK S VEL: 0.45 M/S
PV PEAK VELOCITY: 0.95 CM/S
RA MAJOR: 3.97 CM
RA PRESSURE: 3 MMHG
RA WIDTH: 4.1 CM
RBC # BLD AUTO: 4.34 M/UL (ref 4.6–6.2)
SODIUM SERPL-SCNC: 138 MMOL/L (ref 136–145)
TDI LATERAL: 0.14 M/S
TDI SEPTAL: 0.08 M/S
TDI: 0.11 M/S
TR MAX PG: 43 MMHG
TV REST PULMONARY ARTERY PRESSURE: 46 MMHG
WBC # BLD AUTO: 5.94 K/UL (ref 3.9–12.7)

## 2023-06-17 PROCEDURE — 93306 TTE W/DOPPLER COMPLETE: CPT

## 2023-06-17 PROCEDURE — 93306 TTE W/DOPPLER COMPLETE: CPT | Mod: 26,,, | Performed by: INTERNAL MEDICINE

## 2023-06-17 PROCEDURE — G0378 HOSPITAL OBSERVATION PER HR: HCPCS

## 2023-06-17 PROCEDURE — 36415 COLL VENOUS BLD VENIPUNCTURE: CPT | Performed by: PHYSICIAN ASSISTANT

## 2023-06-17 PROCEDURE — 99213 PR OFFICE/OUTPT VISIT, EST, LEVL III, 20-29 MIN: ICD-10-PCS | Mod: 25,,, | Performed by: INTERNAL MEDICINE

## 2023-06-17 PROCEDURE — 93306 ECHO (CUPID ONLY): ICD-10-PCS | Mod: 26,,, | Performed by: INTERNAL MEDICINE

## 2023-06-17 PROCEDURE — 80053 COMPREHEN METABOLIC PANEL: CPT | Performed by: PHYSICIAN ASSISTANT

## 2023-06-17 PROCEDURE — 99213 OFFICE O/P EST LOW 20 MIN: CPT | Mod: 25,,, | Performed by: INTERNAL MEDICINE

## 2023-06-17 PROCEDURE — 85025 COMPLETE CBC W/AUTO DIFF WBC: CPT | Performed by: PHYSICIAN ASSISTANT

## 2023-06-17 NOTE — PROGRESS NOTES
[facility] Emergency Dept (Observation)  Cardiology  Progress Note    Patient Name: Dillon Wilhelm III  MRN: [id number]  Admission Date: 6/16/2023  Hospital Length of Stay: 0 days  Code Status: Full Code   Attending Physician: Tere Powers MD   Primary Care Physician: Primary Doctor No  Expected Discharge Date:   Principal Problem:Other chest pain    Subjective:     Brief HPI:    Dillon Wilhelm III is a 42 y.o.male . He is a current smoker. 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 He did this for several days in a row to 6/14/2023. On 6/15/2023 he helped a friend move a dresser. On 6/16/2023 he woke up with pain in the left flank, Initially the pain became more pronounced when lifting his left arm but the pain later became more persistent. He presented to the ER and it was felt he should be admitted for observation.    Hospital Course:    Observation.    6/17/2023: Echo: Normal left ventricular size and systolic function. Trivial AR. Mildly thickened mitral valve. SPAP 46 mmHg.    Interval History:     Atypical CP has resolved.    Review of Systems   Constitutional: Negative for chills, fever and malaise/fatigue.   HENT:  Negative for nosebleeds.    Eyes:  Negative for vision loss in left eye and vision loss in right eye.   Cardiovascular:  Negative for chest pain, leg swelling, orthopnea, palpitations and paroxysmal nocturnal dyspnea.   Respiratory:  Negative for cough, hemoptysis, shortness of breath, sputum production and wheezing.    Hematologic/Lymphatic: Negative for bleeding problem. Does not bruise/bleed easily.   Skin:  Negative for color change and rash.   Musculoskeletal:  Negative for muscle weakness and myalgias.   Gastrointestinal:  Negative for abdominal pain, heartburn, hematemesis, hematochezia, melena, nausea and vomiting.   Genitourinary:  Negative for hematuria.   Neurological:  Negative for dizziness, focal weakness, headaches, light-headedness, vertigo and weakness.    Psychiatric/Behavioral:  Negative for altered mental status. The patient is not nervous/anxious.    Allergic/Immunologic: Negative for persistent infections.   Objective:     Vital Signs (Most Recent):  Temp: 97.6 °F (36.4 °C) (06/17/23 1234)  Pulse: 86 (06/17/23 1234)  Resp: 17 (06/17/23 1234)  BP: (!) 131/91 (06/17/23 1234)  SpO2: 100 % (06/17/23 1234) Vital Signs (24h Range):  Temp:  [97.4 °F (36.3 °C)-97.8 °F (36.6 °C)] 97.6 °F (36.4 °C)  Pulse:  [48-86] 86  Resp:  [17-18] 17  SpO2:  [94 %-100 %] 100 %  BP: (106-163)/(66-95) 131/91     Weight: 68.9 kg (152 lb)  Body mass index is 21.2 kg/m².    SpO2: 100 %       No intake or output data in the 24 hours ending 06/17/23 1236    Lines/Drains/Airways       Peripheral Intravenous Line  Duration                  Peripheral IV - Single Lumen 06/16/23 0753 20 G Anterior;Right Upper Arm 1 day                    Physical Exam  Constitutional:       General: He is not in acute distress.     Appearance: Normal appearance. He is well-developed. He is not ill-appearing.   Eyes:      Conjunctiva/sclera:      Right eye: Right conjunctiva is not injected. No hemorrhage.     Left eye: Left conjunctiva is not injected. No hemorrhage.     Pupils:      Right eye: Pupil is round.      Left eye: Pupil is round.   Neck:      Vascular: No JVD.   Cardiovascular:      Rate and Rhythm: Normal rate and regular rhythm.      Heart sounds: S1 normal and S2 normal. Murmur heard.   Midsystolic murmur is present with a grade of 2/6 at the upper right sternal border.   Pulmonary:      Effort: Pulmonary effort is normal.      Breath sounds: Normal breath sounds.   Chest:      Chest wall: No swelling or tenderness.   Abdominal:      General: There is no distension.      Palpations: Abdomen is soft.      Tenderness: There is no abdominal tenderness.   Musculoskeletal:      Cervical back: Neck supple.      Right ankle: No swelling.      Left ankle: No swelling.   Skin:     General: Skin is warm and  dry.      Findings: No rash.   Neurological:      Mental Status: He is alert and oriented to person, place, and time. He is not disoriented.   Psychiatric:         Attention and Perception: Attention normal.         Mood and Affect: Mood normal.         Speech: Speech normal.         Behavior: Behavior normal.         Thought Content: Thought content normal.         Cognition and Memory: Cognition and memory normal.         Judgment: Judgment normal.       Current Medications:      Current Laboratory Results:    Recent Results (from the past 24 hour(s))   Troponin I    Collection Time: 06/16/23  5:14 PM   Result Value Ref Range    Troponin I <0.006 0.000 - 0.026 ng/mL   CBC auto differential    Collection Time: 06/17/23  6:57 AM   Result Value Ref Range    WBC 5.94 3.90 - 12.70 K/uL    RBC 4.34 (L) 4.60 - 6.20 M/uL    Hemoglobin 14.9 14.0 - 18.0 g/dL    Hematocrit 44.5 40.0 - 54.0 %     (H) 82 - 98 fL    MCH 34.3 (H) 27.0 - 31.0 pg    MCHC 33.5 32.0 - 36.0 g/dL    RDW 12.0 11.5 - 14.5 %    Platelets 235 150 - 450 K/uL    MPV 8.4 (L) 9.2 - 12.9 fL    Immature Granulocytes 0.3 0.0 - 0.5 %    Gran # (ANC) 3.1 1.8 - 7.7 K/uL    Immature Grans (Abs) 0.02 0.00 - 0.04 K/uL    Lymph # 2.1 1.0 - 4.8 K/uL    Mono # 0.6 0.3 - 1.0 K/uL    Eos # 0.1 0.0 - 0.5 K/uL    Baso # 0.04 0.00 - 0.20 K/uL    nRBC 0 0 /100 WBC    Gran % 51.7 38.0 - 73.0 %    Lymph % 35.9 18.0 - 48.0 %    Mono % 10.1 4.0 - 15.0 %    Eosinophil % 1.3 0.0 - 8.0 %    Basophil % 0.7 0.0 - 1.9 %    Differential Method Automated    Comprehensive metabolic panel    Collection Time: 06/17/23  6:57 AM   Result Value Ref Range    Sodium 138 136 - 145 mmol/L    Potassium 4.0 3.5 - 5.1 mmol/L    Chloride 100 95 - 110 mmol/L    CO2 29 23 - 29 mmol/L    Glucose 98 70 - 110 mg/dL    BUN 8 6 - 20 mg/dL    Creatinine 0.9 0.5 - 1.4 mg/dL    Calcium 9.2 8.7 - 10.5 mg/dL    Total Protein 7.2 6.0 - 8.4 g/dL    Albumin 3.8 3.5 - 5.2 g/dL    Total Bilirubin 1.5 (H) 0.1 -  1.0 mg/dL    Alkaline Phosphatase 86 55 - 135 U/L    AST 27 10 - 40 U/L    ALT 19 10 - 44 U/L    Anion Gap 9 8 - 16 mmol/L    eGFR >60 >60 mL/min/1.73 m^2   Echo Saline Bubble? No    Collection Time: 06/17/23  8:53 AM   Result Value Ref Range    BSA 1.86 m2    TDI SEPTAL 0.08 m/s    LV LATERAL E/E' RATIO 5.57 m/s    LV SEPTAL E/E' RATIO 9.75 m/s    LA WIDTH 4.10 cm    IVC diameter 1.30 cm    Left Ventricular Outflow Tract Mean Velocity 0.57 cm/s    Left Ventricular Outflow Tract Mean Gradient 1.50 mmHg    TDI LATERAL 0.14 m/s    PV PEAK VELOCITY 0.95 cm/s    LVIDd 5.00 3.5 - 6.0 cm    IVS 0.73 0.6 - 1.1 cm    Posterior Wall 0.67 0.6 - 1.1 cm    LVIDs 3.45 2.1 - 4.0 cm    FS 31 28 - 44 %    LA volume 63.95 cm3    LV mass 114.70 g    LA size 3.81 cm    Left Ventricle Relative Wall Thickness 0.27 cm    AV mean gradient 8 mmHg    AV valve area 1.87 cm2    AV Velocity Ratio 0.41     AV index (prosthetic) 0.46     MV valve area p 1/2 method 4.15 cm2    E/A ratio 1.90     Mean e' 0.11 m/s    E wave deceleration time 182.62 msec    IVRT 71.36 msec    Pulm vein S/D ratio 0.88     LVOT diameter 2.28 cm    LVOT area 4.1 cm2    LVOT peak christian 0.85 m/s    LVOT peak VTI 21.30 cm    Ao peak christian 2.05 m/s    Ao VTI 46.6 cm    LVOT stroke volume 86.92 cm3    AV peak gradient 17 mmHg    E/E' ratio 7.09 m/s    MV Peak E Christian 0.78 m/s    TR Max Christian 3.28 m/s    MV stenosis pressure 1/2 time 52.96 ms    MV Peak A Christian 0.41 m/s    PV Peak S Christian 0.45 m/s    PV Peak D Christian 0.51 m/s    LV Systolic Volume 49.21 mL    LV Systolic Volume Index 26.2 mL/m2    LV Diastolic Volume 117.99 mL    LV Diastolic Volume Index 62.76 mL/m2    LA Volume Index 34.0 mL/m2    LV Mass Index 61 g/m2    RA Major Axis 3.97 cm    Left Atrium Minor Axis 5.03 cm    Left Atrium Major Axis 4.62 cm    Triscuspid Valve Regurgitation Peak Gradient 43 mmHg    LA Volume Index (Mod) 26.1 mL/m2    LA volume (mod) 49.00 cm3    RA Width 4.10 cm    Right Atrial Pressure (from IVC) 3  mmHg    EF 65 %    TV rest pulmonary artery pressure 46 mmHg     Current Imaging Results:    X-Ray Chest AP Portable   Final Result      No acute cardiopulmonary finding identified on this single view.         Electronically signed by: Ulices Vanegas MD   Date:    06/16/2023   Time:    08:33          6/17/2023: Echo:    The left ventricle is normal in size with normal systolic function.  The estimated ejection fraction is 65%.  Normal left ventricular diastolic function.  Normal right ventricular size with normal right ventricular systolic function.  There is mild pulmonary hypertension.  The estimated PA systolic pressure is 46 mmHg.  Normal central venous pressure (3 mmHg).    Assessment and Plan:     Problem List:    Active Diagnoses:    Diagnosis Date Noted POA    PRINCIPAL PROBLEM:  Other chest pain [R07.89] 06/16/2023 Unknown      Problems Resolved During this Admission:     Assessment and Plan:     Chest Pain  6/16/2023: Began experience pain left chest. At first affected by moving arm.  ECG no acute changes. Troponin x 3 negative.  6/17/2023: Echo: Normal left ventricular size and systolic function. Trivial AR. Mildly thickened mitral valve. SPAP 46 mmHg.  Suspect a musculoskeletal cause.  Resolved.   Needs f/u of blood pressure.    2. Pulmonary Hypertension   6/17/2023: Echo: SPAP 46 mmHg.  Suspect related to smoking. Maybe HTN.     3. Current Smoker              Strongly advised to quit.     VTE Risk Mitigation (From admission, onward)           Ordered     IP VTE LOW RISK PATIENT  Once         06/16/23 1125                    Genaro Son MD  Cardiology  [facility] - Emergency Dept (Observation)

## 2023-06-17 NOTE — ED NOTES
Resumed pt care. 42 YOM presents to ED with c/o CP and SOB. Currently denies CP c/o left mid back pain only 5/10. -SOB. Dx. CP. Pending echo per Cardiologist. A&Ox4. Denies any other complaints.     LOC: The patient is awake, alert and aware of environment with an appropriate affect, the patient is oriented x 3.  APPEARANCE: Patient resting comfortably and in no acute distress, patient is clean and well groomed, patient's clothing is properly fastened.  SKIN: The skin is warm and dry, patient has normal skin turgor and moist mucus membranes, skin intact, no breakdown or brusing noted.  MUSKULOSKELETAL: Patient moving all extremities well, no obvious swelling or deformities noted.  RESPIRATORY: Airway is open and patent, respirations are spontaneous, patient has a normal effort and rate.  CARDIAC: No peripheral edema.  ABDOMEN: Soft and no tenderness to palpation, no distention noted.     ED workup in progress. VSS. Safety measures in place; side rails up x2. Call light within pt reach. Will continue to monitor.

## 2023-06-17 NOTE — CONSULTS
[facility] Emergency Dept (Observation)  Cardiology  Consult Note    Patient Name: Dillon Wilhelm III  MRN: [id number]  Admission Date: 6/16/2023  Hospital Length of Stay: 0 days  Code Status: Full Code   Attending Provider: Tere Powers MD   Consulting Provider: Genaro Son MD  Primary Care Physician: Primary Doctor No  Principal Problem:Other chest pain    Patient information was obtained from patient, past medical records, ER records, and primary team.     Inpatient consult to Cardiology  Consult performed by: Genaro Son MD  Consult ordered by: PRINCE Wiley  Reason for consult: Chest pain      Subjective:     Chief Complaint:  Chest pain.     HPI:     Dillon Wilhelm III is a 42 y.o.male . He is a current smoker. 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 He did this for several days in a row to 6/14/2023. On 6/15/2023 he helped a friend move a dresser. On 6/16/2023 he woke up with pain in the left flank, Initially the pain became more pronounced when lifting his left arm but the pain later became more persistent. He presented to the ER and it was felt he should be admitted for observation.    Past Medical History:   Diagnosis Date    Bronchitis     GERD (gastroesophageal reflux disease)     Irregular heart beat        No past surgical history on file.    Review of patient's allergies indicates:  No Known Allergies    No current facility-administered medications on file prior to encounter.     Current Outpatient Medications on File Prior to Encounter   Medication Sig    amoxicillin-clavulanate 875-125mg (AUGMENTIN) 875-125 mg per tablet Take 1 tablet by mouth 2 (two) times daily.    naproxen (NAPROSYN) 500 MG tablet Take 1 tablet (500 mg total) by mouth 2 (two) times daily with meals. For pain     Family History    None       Tobacco Use    Smoking status: Every Day     Types: Cigarettes    Smokeless tobacco: Not on file   Substance and Sexual Activity    Alcohol use: Yes     Comment:  socially     Drug use: Yes     Types: Marijuana    Sexual activity: Not on file     Review of Systems   Constitutional: Negative for chills, fever and malaise/fatigue.   HENT:  Negative for nosebleeds and tinnitus.    Eyes:  Negative for double vision, vision loss in left eye and vision loss in right eye.   Cardiovascular:  Positive for chest pain. Negative for claudication, dyspnea on exertion, irregular heartbeat, leg swelling, near-syncope, orthopnea, palpitations, paroxysmal nocturnal dyspnea and syncope.   Respiratory:  Negative for cough, hemoptysis, shortness of breath and wheezing.    Endocrine: Negative for cold intolerance and heat intolerance.   Hematologic/Lymphatic: Negative for bleeding problem. Does not bruise/bleed easily.   Skin:  Negative for color change and rash.   Musculoskeletal:  Negative for back pain, falls, muscle weakness and myalgias.   Gastrointestinal:  Negative for abdominal pain, diarrhea, dysphagia, heartburn, hematemesis, hematochezia, hemorrhoids, jaundice, melena, nausea and vomiting.   Genitourinary:  Negative for dysuria and hematuria.   Neurological:  Negative for dizziness, focal weakness, headaches, light-headedness, loss of balance, numbness, tremors, vertigo and weakness.   Psychiatric/Behavioral:  Negative for altered mental status, depression and memory loss. The patient is not nervous/anxious.    Allergic/Immunologic: Negative for hives and persistent infections.   Objective:     Vital Signs (Most Recent):  Temp: 97.6 °F (36.4 °C) (06/16/23 1704)  Pulse: 61 (06/16/23 1704)  Resp: 18 (06/16/23 1704)  BP: (!) 163/95 (06/16/23 1704)  SpO2: 98 % (06/16/23 1704) Vital Signs (24h Range):  Temp:  [97.4 °F (36.3 °C)-97.8 °F (36.6 °C)] 97.6 °F (36.4 °C)  Pulse:  [49-87] 61  Resp:  [14-18] 18  SpO2:  [94 %-98 %] 98 %  BP: (105-163)/(72-95) 163/95     Weight: 68.9 kg (152 lb)  Body mass index is 21.2 kg/m².    SpO2: 98 %       No intake or output data in the 24 hours ending  06/16/23 2016    Lines/Drains/Airways       Peripheral Intravenous Line  Duration                  Peripheral IV - Single Lumen 06/16/23 0753 20 G Anterior;Right Upper Arm <1 day                    Physical Exam  Constitutional:       General: He is not in acute distress.     Appearance: Normal appearance. He is well-developed. He is not toxic-appearing or diaphoretic.   HENT:      Head: Normocephalic and atraumatic.      Nose: Nose normal.   Eyes:      General:         Right eye: No discharge.         Left eye: No discharge.      Conjunctiva/sclera:      Right eye: Right conjunctiva is not injected.      Left eye: Left conjunctiva is not injected.      Pupils: Pupils are equal.      Right eye: Pupil is round.      Left eye: Pupil is round.   Neck:      Thyroid: No thyromegaly.      Vascular: No carotid bruit or JVD.   Cardiovascular:      Rate and Rhythm: Normal rate and regular rhythm. No extrasystoles are present.     Chest Wall: PMI is not displaced.      Pulses:           Radial pulses are 2+ on the right side and 2+ on the left side.        Femoral pulses are 2+ on the right side and 2+ on the left side.       Dorsalis pedis pulses are 2+ on the right side and 2+ on the left side.        Posterior tibial pulses are 2+ on the right side and 2+ on the left side.      Heart sounds: S1 normal and S2 normal. Murmur heard.   Systolic murmur is present with a grade of 2/6 at the upper right sternal border.     No gallop.   Pulmonary:      Effort: Pulmonary effort is normal.      Breath sounds: Examination of the right-middle field reveals rhonchi. Examination of the left-middle field reveals rhonchi. Rhonchi present.   Abdominal:      Palpations: Abdomen is soft.      Tenderness: There is no abdominal tenderness.   Musculoskeletal:      Cervical back: Neck supple.      Right lower leg: Normal. No swelling. No edema.      Left lower leg: Normal. No swelling. No edema.   Lymphadenopathy:      Head:      Right side of  head: No submandibular adenopathy.      Left side of head: No submandibular adenopathy.      Cervical: No cervical adenopathy.   Skin:     General: Skin is warm and dry.      Findings: No rash.      Nails: There is no clubbing.   Neurological:      General: No focal deficit present.      Mental Status: He is alert and oriented to person, place, and time. He is not disoriented.      Cranial Nerves: No cranial nerve deficit.   Psychiatric:         Attention and Perception: Attention normal.         Mood and Affect: Mood and affect normal.         Speech: Speech normal.         Behavior: Behavior normal.         Thought Content: Thought content normal.         Cognition and Memory: Cognition and memory normal.         Judgment: Judgment normal.       Current Medications:      Current Laboratory Results:    Recent Results (from the past 24 hour(s))   Comprehensive metabolic panel    Collection Time: 06/16/23  7:36 AM   Result Value Ref Range    Sodium 138 136 - 145 mmol/L    Potassium 4.3 3.5 - 5.1 mmol/L    Chloride 107 95 - 110 mmol/L    CO2 22 (L) 23 - 29 mmol/L    Glucose 89 70 - 110 mg/dL    BUN 9 6 - 20 mg/dL    Creatinine 0.8 0.5 - 1.4 mg/dL    Calcium 8.9 8.7 - 10.5 mg/dL    Total Protein 7.4 6.0 - 8.4 g/dL    Albumin 3.8 3.5 - 5.2 g/dL    Total Bilirubin 0.6 0.1 - 1.0 mg/dL    Alkaline Phosphatase 82 55 - 135 U/L    AST 32 10 - 40 U/L    ALT 19 10 - 44 U/L    Anion Gap 9 8 - 16 mmol/L    eGFR >60 >60 mL/min/1.73 m^2   CBC auto differential    Collection Time: 06/16/23  7:36 AM   Result Value Ref Range    WBC 6.08 3.90 - 12.70 K/uL    RBC 4.25 (L) 4.60 - 6.20 M/uL    Hemoglobin 14.7 14.0 - 18.0 g/dL    Hematocrit 43.4 40.0 - 54.0 %     (H) 82 - 98 fL    MCH 34.6 (H) 27.0 - 31.0 pg    MCHC 33.9 32.0 - 36.0 g/dL    RDW 12.1 11.5 - 14.5 %    Platelets 230 150 - 450 K/uL    MPV 8.4 (L) 9.2 - 12.9 fL    Immature Granulocytes 0.3 0.0 - 0.5 %    Gran # (ANC) 3.0 1.8 - 7.7 K/uL    Immature Grans (Abs) 0.02 0.00 -  0.04 K/uL    Lymph # 2.2 1.0 - 4.8 K/uL    Mono # 0.7 0.3 - 1.0 K/uL    Eos # 0.1 0.0 - 0.5 K/uL    Baso # 0.04 0.00 - 0.20 K/uL    nRBC 0 0 /100 WBC    Gran % 49.7 38.0 - 73.0 %    Lymph % 36.5 18.0 - 48.0 %    Mono % 11.3 4.0 - 15.0 %    Eosinophil % 1.5 0.0 - 8.0 %    Basophil % 0.7 0.0 - 1.9 %    Differential Method Automated    Troponin I    Collection Time: 06/16/23  7:36 AM   Result Value Ref Range    Troponin I <0.006 0.000 - 0.026 ng/mL   Brain natriuretic peptide    Collection Time: 06/16/23  7:36 AM   Result Value Ref Range    BNP <10 0 - 99 pg/mL   D-Dimer, Quantitative    Collection Time: 06/16/23  7:36 AM   Result Value Ref Range    D-Dimer <0.19 <0.50 mg/L FEU   Troponin I    Collection Time: 06/16/23 11:37 AM   Result Value Ref Range    Troponin I 0.007 0.000 - 0.026 ng/mL   Lipase    Collection Time: 06/16/23 11:37 AM   Result Value Ref Range    Lipase 36 4 - 60 U/L   Troponin I    Collection Time: 06/16/23  5:14 PM   Result Value Ref Range    Troponin I <0.006 0.000 - 0.026 ng/mL     Current Imaging Results:    X-Ray Chest AP Portable   Final Result      No acute cardiopulmonary finding identified on this single view.         Electronically signed by: Ulices Vanegas MD   Date:    06/16/2023   Time:    08:33        ECG: Early repolarization.    Assessment and Plan:     Active Diagnoses:    Diagnosis Date Noted POA    PRINCIPAL PROBLEM:  Other chest pain [R07.89] 06/16/2023 Unknown      Problems Resolved During this Admission:     Assessment and Plan:    Chest Pain  6/16/2023: Began experience pain left chest. At first affected by moving arm.  ECG no acute changes. Troponin x 3 negative.  Suspect a musculoskeletal cause.  6/17/2023: Do Echo.    2. Current Smoker   Advised to quit.    VTE Risk Mitigation (From admission, onward)           Ordered     IP VTE LOW RISK PATIENT  Once         06/16/23 5565                    Thank you for your consult.     I will follow-up with patient. Please contact us  if you have any additional questions.    Genaro Son MD  Cardiology   [facility] - Emergency Dept (Observation)

## 2023-06-17 NOTE — H&P
ED Observation Unit  History and Physical      I assumed care of this patient from the Main ED at onset of observation time, 12:00 pm on 06/16/2023.       History of Present Illness:    This is a 42 y.o. male, with past medical history of bronchitis and GERD, who presents with complaint of continuous left chest pain 10/10 that radiates to the left back. Patient reports that 1 hour ago, he was getting dress for work when the pain began.  Pain worsens with inspiration.  He has associated shortness of breath. Patient denies any pain or discomfort before going to bed last night. He denies fever, nausea or vomiting.  Denies history of pain similar to this.  Social history includes everyday use of cigarettes.  Denies any drugs or alcohol.  Denies family history of cardiac disease.  This is the extent of the patient's complaints at this time.    I reviewed the ED Provider Note dated 06/16/2023  prior to my evaluation of this patient.  I reviewed all labs and imaging performed in the Main ED, prior to patient being placed in Observation. Patient was placed in the ED Observation Unit for Other chest pain.    PMHx   Past Medical History:   Diagnosis Date    Bronchitis     GERD (gastroesophageal reflux disease)     Irregular heart beat       No past surgical history on file.     Family Hx   No family history on file.     Social Hx   Social History     Socioeconomic History    Marital status: Single   Tobacco Use    Smoking status: Every Day     Types: Cigarettes   Substance and Sexual Activity    Alcohol use: Yes     Comment: socially     Drug use: Yes     Types: Marijuana        Vital Signs   Vitals:    06/16/23 1404 06/16/23 1600 06/16/23 1704 06/16/23 2054   BP:   (!) 163/95 (!) 152/92   BP Location:   Left arm Left arm   Patient Position:   Sitting Sitting   Pulse: (!) 54 (!) 49 61 (!) 58   Resp:   18 18   Temp:   97.6 °F (36.4 °C) 97.8 °F (36.6 °C)   TempSrc:   Oral Oral   SpO2:   98% 96%   Weight:       Height:             Review of Systems  See HPI    Physical Exam  Nursing note and vitals reviewed.  Constitutional: He appears well-developed and well-nourished.   HENT:   Head: Normocephalic and atraumatic.   Eyes: Conjunctivae are normal.   Neck: Neck supple.   Normal range of motion.  Cardiovascular:  Normal rate, regular rhythm and normal heart sounds.           Pulmonary/Chest: Breath sounds normal. No respiratory distress. He has no wheezes. He has no rales.  Mild TTP of the left lateral pick muscle.  No erythema, no edema, no crepitus, no rash noted  Abdominal: Abdomen is soft. Bowel sounds are normal. He exhibits no distension. There is no abdominal tenderness. There is no rebound.   Musculoskeletal:         General: No tenderness or edema. Normal range of motion.      Cervical back: Normal range of motion and neck supple.      Neurological: He is alert and oriented to person, place, and time.   Skin: Skin is warm and dry. Capillary refill takes less than 2 seconds.    Medications:   Scheduled Meds:  Continuous Infusions:  PRN Meds:.melatonin, nitroGLYCERIN, ondansetron, sodium chloride 0.9%      Assessment/Plan:  1. Chest pain      Initial ED course grossly unremarkable.  Given smoking history, age believe he would benefit from serial troponin cardiology evaluation.  He is never seen a cardiologist in the past.  Do suspect some musculoskeletal component given the reproducible nature and recent lifting.    Case was discussed with the ED provider

## 2023-06-17 NOTE — DISCHARGE SUMMARY
[facility] ED Observation Unit  Discharge Summary        History of Present Illness:    This is a 42 y.o. male, with past medical history of bronchitis and GERD, who presents with complaint of continuous left chest pain 10/10 that radiates to the left back. Patient reports that 1 hour ago, he was getting dress for work when the pain began.  Pain worsens with inspiration.  He has associated shortness of breath. Patient denies any pain or discomfort before going to bed last night. He denies fever, nausea or vomiting.  Denies history of pain similar to this.  Social history includes everyday use of cigarettes.  Denies any drugs or alcohol.  Denies family history of cardiac disease.  This is the extent of the patient's complaints at this time.     ED course:  Initial troponin was negative, EKG no evidence of ischemia.  He was admitted to the EDOU for trending troponins and cardiology consult.    Observation Course:    Patient had complete relief of chest pain throughout his stay in the EDOU, he was evaluated by Cardiology who recommended echo.  Echo revealed normal ventricular size and function he does have a mildly thickened mitral valve, along with pulmonary hypertension.  And had negative troponin x3.  Cleared by Cardiology with follow-up in clinic.    Brief Physical Exam/Reassessment   Review of Systems   Constitutional:  Negative for chills and fever.   Respiratory:  Negative for cough and shortness of breath.    Cardiovascular:  Negative for chest pain and palpitations.   Neurological:  Negative for dizziness and weakness.   All other systems reviewed and are negative.    Physical Exam    Nursing note and vitals reviewed.  Constitutional: He appears well-developed and well-nourished. He does not appear ill. No distress.   Neck: Neck supple.   Cardiovascular:  Normal rate, regular rhythm, S1 normal, S2 normal and normal heart sounds.           Pulmonary/Chest: Effort normal and breath sounds normal. No tachypnea.  He has no decreased breath sounds. He has no wheezes. He exhibits no mass, no tenderness, no crepitus and no edema.   Abdominal: Abdomen is soft and flat. There is no abdominal tenderness.   Musculoskeletal:      Cervical back: Neck supple.     Neurological: He is alert and oriented to person, place, and time. GCS eye subscore is 4. GCS verbal subscore is 5. GCS motor subscore is 6.   Skin: Skin is warm, dry and intact.       Consultants:    Cardiology    ED/OBS Workup:  Vitals:    06/17/23 0600 06/17/23 0805 06/17/23 1051 06/17/23 1058   BP:  (!) 127/94     Pulse: (!) 52 64 (!) 50 (!) 48   Resp:  17     Temp:  97.6 °F (36.4 °C)     TempSrc:  Oral     SpO2:  98%     Weight:       Height:        06/17/23 1234   BP: (!) 131/91   Pulse: 86   Resp: 17   Temp: 97.6 °F (36.4 °C)   TempSrc: Oral   SpO2: 100%   Weight:    Height:        Labs Reviewed   COMPREHENSIVE METABOLIC PANEL - Abnormal; Notable for the following components:       Result Value    CO2 22 (*)     All other components within normal limits   CBC W/ AUTO DIFFERENTIAL - Abnormal; Notable for the following components:    RBC 4.25 (*)      (*)     MCH 34.6 (*)     MPV 8.4 (*)     All other components within normal limits   CBC W/ AUTO DIFFERENTIAL - Abnormal; Notable for the following components:    RBC 4.34 (*)      (*)     MCH 34.3 (*)     MPV 8.4 (*)     All other components within normal limits   COMPREHENSIVE METABOLIC PANEL - Abnormal; Notable for the following components:    Total Bilirubin 1.5 (*)     All other components within normal limits   TROPONIN I   B-TYPE NATRIURETIC PEPTIDE   D DIMER, QUANTITATIVE   D DIMER, QUANTITATIVE   TROPONIN I   LIPASE   LIPASE   TROPONIN I       X-Ray Chest AP Portable   Final Result      No acute cardiopulmonary finding identified on this single view.         Electronically signed by: Ulices Vanegas MD   Date:    06/16/2023   Time:    08:33          Final Diagnosis:  1. Musculoskeletal chest pain     2. Chest pain    3. Abnormal echocardiogram    4. Pulmonary HTN        Plan:  Discharge home with close follow-up with cardiologist.  Discussed needs follow-up with his primary care provider also.  Strict return precautions if he develops any chest pain or shortness breath he is to return to emergency department for re-evaluation.  He stated understanding.    Discharge Condition: Good    Disposition: Home or Self Care     Time spent on the discharge of the patient including review of hospital course with the patient. reviewing discharge medications and arranging follow-up care 35 minutes.  Patient was seen and examined on the date of discharge and determined to be suitable for discharge.    Follow Up:   ED Disposition Condition    Discharge Stable            ED Prescriptions    None       Follow-up Information       Follow up With Specialties Details Why Contact Info    [facility] - Emergency Dept Emergency Medicine Go to  If symptoms worsen [address]  [facility] [address] [phone]            No future appointments.

## 2023-06-21 ENCOUNTER — TELEPHONE (OUTPATIENT)
Dept: ADMINISTRATIVE | Facility: OTHER | Age: 43
End: 2023-06-21
Payer: MEDICAID

## 2023-08-14 ENCOUNTER — HOSPITAL ENCOUNTER (EMERGENCY)
Facility: OTHER | Age: 43
Discharge: HOME | End: 2023-08-14
Attending: EMERGENCY MEDICINE
Payer: MEDICAID

## 2023-08-14 VITALS
SYSTOLIC BLOOD PRESSURE: 142 MMHG | TEMPERATURE: 98 F | DIASTOLIC BLOOD PRESSURE: 92 MMHG | RESPIRATION RATE: 18 BRPM | HEART RATE: 90 BPM | OXYGEN SATURATION: 97 %
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
## 2023-08-14 DIAGNOSIS — S52.125A CLOSED NONDISPLACED FRACTURE OF HEAD OF LEFT RADIUS, INITIAL ENCOUNTER: Primary | ICD-10-CM

## 2023-08-14 PROCEDURE — 99284 EMERGENCY DEPT VISIT MOD MDM: CPT

## 2023-08-14 RX ORDER — HYDROCODONE BITARTRATE AND ACETAMINOPHEN 5; 325 MG/1; MG/1
1 TABLET ORAL EVERY 4 HOURS PRN
Qty: 8 TABLET | Refills: 0 | Status: SHIPPED | OUTPATIENT
Start: 2023-08-14 | End: 2023-08-24

## 2023-08-14 RX ORDER — IBUPROFEN 600 MG/1
600 TABLET ORAL EVERY 6 HOURS PRN
Qty: 20 TABLET | Refills: 0 | OUTPATIENT
Start: 2023-08-14 | End: 2023-11-30

## 2023-08-14 NOTE — ED PROVIDER NOTES
SCRIBE #1 NOTE: I, Jaydon Diaz, am scribing for, and in the presence of,  Derek Engle DO. I have scribed the following portions of the note - Other sections scribed: HPI, ROS, PE.         Source of History:  patient    Chief complaint:  Fall (Pt fell off porch yesterday. C/o left arm pain -hit head. )      HPI:  Dillon Wilhelm III is a 42 y.o. male presenting with pain after a mechanical fall that occurred last night. He states that he was with a friend on his porch and fell forward but was able to catch himself, though he did strike his left side. He denies hitting his head. He reports only having minimal pain at the time and went to sleep afterwards. This morning when he woke up, he reports increased pain in his left wrist, left elbow, and the upper portion of his left arm and decided to come to the ED to be evaluated further before going back to work. The patient is right hand dominant. He denies any fever, chills, nausea, or vomiting. He has not had any previous injuries to his left side where he currently is experiencing pain.    This is the extent to the patients complaints today here in the emergency department.    ROS:   See HPI.    Review of patient's allergies indicates:  No Known Allergies    PMH:  As per HPI and below:  Past Medical History:   Diagnosis Date    Bronchitis     GERD (gastroesophageal reflux disease)     Irregular heart beat      No past surgical history on file.    Social History     Tobacco Use    Smoking status: Every Day     Current packs/day: 0.00     Types: Cigarettes   Substance Use Topics    Alcohol use: Yes     Comment: socially     Drug use: Yes     Types: Marijuana       Physical Exam:    BP (!) 135/94 (BP Location: Right arm)   Pulse 99   Temp 98.3 °F (36.8 °C) (Oral)   Resp 18   SpO2 97%   Nursing note and vital signs reviewed.  Constitutional: No acute distress.  Nontoxic  Head:  Normocephalic atraumatic  Musculoskeletal: Tenderness to palpation of left radial head.  Pain with passive flexion at left wrist. Holding elbow at 120 degrees, unable to completely extend secondary to pain. Flexed to 75 degrees before pain. 2+ Radial pulses.  Neuro: Cranial nerves intact.  Skin: No rashes seen.        MDM/ Differential Dx:   42-year-old male presents with left elbow pain and wrist pain after fall yesterday.  Differential could include radial head fracture, wrist sprain versus fracture.  No obvious findings to suggest shoulder injury or fracture.  Will get x-rays of the elbow and wrist.  I offered analgesia with the patient declined.      ED Course as of 08/14/23 0814   Mon Aug 14, 2023   0721 X-Ray Wrist Complete Left  X-ray of the left wrist independently interpreted by myself shows no fracture or dislocation [SM]   0722 X-Ray Elbow Complete Left  X-ray of the left elbow independently interpreted by myself shows subtle irregularity of the radial head with the appearance of sail sign on the anterior fat pad consistent with concern of nondisplaced radial head fracture [SM]      ED Course User Index  [SM] Derek Engle, DO              Scribe Attestation:   Scribe #1: I performed the above scribed service and the documentation accurately describes the services I performed. I attest to the accuracy of the note.    Physician Attestation for Scribe: I, Dr Derek Engle, reviewed documentation as scribed in my presence, which is both accurate and complete.   Diagnostic Impression:    1. Closed nondisplaced fracture of head of left radius, initial encounter    2. Fall         ED Disposition Condition    Discharge Stable            ED Prescriptions       Medication Sig Dispense Start Date End Date Auth. Provider    ibuprofen (ADVIL,MOTRIN) 600 MG tablet Take 1 tablet (600 mg total) by mouth every 6 (six) hours as needed for Pain. 20 tablet 8/14/2023 -- Derek Engle, DO    HYDROcodone-acetaminophen (NORCO) 5-325 mg per tablet Take 1 tablet by mouth every 4 (four) hours as  needed for Pain. 8 tablet 8/14/2023 8/24/2023 Derek Engle, DO          Follow-up Information       Follow up With Specialties Details Why Contact [facility] Surgical Oncology, Orthopedic Surgery, Genetics, Physical Medicine and Rehabilitation, Occupational Therapy, Radiology Schedule an appointment as soon as possible for a visit in 2 weeks Orthopedics [address] [facility] [address]  [phone]      [facility] - Emergency Dept Emergency Medicine  If symptoms worsen [address] [facility] [id number] [phone]             Derek Engle,   08/14/23 0814

## 2023-08-14 NOTE — Clinical Note
"Dillon Chi"Choco was seen and treated in our emergency department on 8/14/2023.  He may return to work on 08/15/2023.       If you have any questions or concerns, please don't hesitate to call.      Opal LEIGH    "

## 2023-08-14 NOTE — Clinical Note
"Dillon Chi"Choco was seen and treated in our emergency department on 8/14/2023.  He may return to work after being cleared by follow-up physician .       If you have any questions or concerns, please don't hesitate to call.      pOal LEIGH"

## 2023-08-14 NOTE — Clinical Note
"Dillon Chi" Choco was seen and treated in our emergency department on 8/14/2023.  He may return to work after being cleared by follow-up physician 08/16/2023.       If you have any questions or concerns, please don't hesitate to call.      Opal LEIGH"

## 2023-08-14 NOTE — ED TRIAGE NOTES
Dillon Wilhelm III, an 42 y.o. male presents to the ED Patient states that he fell off the porch yesterday and is having a hard time moving left upper extremity. Patient is alert and oriented. Pain rating of 8      Chief Complaint   Patient presents with    Fall     Pt fell off porch yesterday. C/o left arm pain -hit head.      Review of patient's allergies indicates:  No Known Allergies  Past Medical History:   Diagnosis Date    Bronchitis     GERD (gastroesophageal reflux disease)     Irregular heart beat

## 2023-08-24 DIAGNOSIS — S52.125A CLOSED NONDISPLACED FRACTURE OF HEAD OF LEFT RADIUS, INITIAL ENCOUNTER: Primary | ICD-10-CM

## 2023-09-11 NOTE — PLAN OF CARE
"Maxi faxed referral to [facility] for this patient.           Your fax has been successfully sent to [phone] at [phone].  ------------------------------------------------------------  From: [id number]  ------------------------------------------------------------  9/11/2023 12:26:25 PM Transmission Record          Sent to [phone] with remote ID "[email or web address]            "          Result: (0/339;0/0) Success          Page record: 1 - 4          Elapsed time: 01:32 on channel 18    "

## 2023-10-09 ENCOUNTER — OFFICE VISIT (OUTPATIENT)
Dept: CARDIOLOGY | Facility: CLINIC | Age: 43
End: 2023-10-09
Attending: INTERNAL MEDICINE
Payer: MEDICAID

## 2023-10-09 VITALS
SYSTOLIC BLOOD PRESSURE: 122 MMHG | HEART RATE: 69 BPM | DIASTOLIC BLOOD PRESSURE: 86 MMHG | WEIGHT: 156.06 LBS | OXYGEN SATURATION: 96 % | BODY MASS INDEX: 21.85 KG/M2 | HEIGHT: 71 IN

## 2023-10-09 DIAGNOSIS — R07.9 CHEST PAIN, UNSPECIFIED TYPE: ICD-10-CM

## 2023-10-09 DIAGNOSIS — F17.200 CURRENT SMOKER: ICD-10-CM

## 2023-10-09 DIAGNOSIS — I27.23 PULMONARY HYPERTENSION DUE TO LUNG DISEASE: ICD-10-CM

## 2023-10-09 DIAGNOSIS — R93.1 ABNORMAL ECHOCARDIOGRAM: ICD-10-CM

## 2023-10-09 PROCEDURE — 1159F MED LIST DOCD IN RCRD: CPT | Mod: CPTII,,, | Performed by: INTERNAL MEDICINE

## 2023-10-09 PROCEDURE — 3074F SYST BP LT 130 MM HG: CPT | Mod: CPTII,,, | Performed by: INTERNAL MEDICINE

## 2023-10-09 PROCEDURE — 99999 PR PBB SHADOW E&M-EST. PATIENT-LVL III: CPT | Mod: PBBFAC,,, | Performed by: INTERNAL MEDICINE

## 2023-10-09 PROCEDURE — 3008F BODY MASS INDEX DOCD: CPT | Mod: CPTII,,, | Performed by: INTERNAL MEDICINE

## 2023-10-09 PROCEDURE — 99214 OFFICE O/P EST MOD 30 MIN: CPT | Mod: S$PBB,,, | Performed by: INTERNAL MEDICINE

## 2023-10-09 PROCEDURE — 3079F DIAST BP 80-89 MM HG: CPT | Mod: CPTII,,, | Performed by: INTERNAL MEDICINE

## 2023-10-09 PROCEDURE — 3079F PR MOST RECENT DIASTOLIC BLOOD PRESSURE 80-89 MM HG: ICD-10-PCS | Mod: CPTII,,, | Performed by: INTERNAL MEDICINE

## 2023-10-09 PROCEDURE — 3008F PR BODY MASS INDEX (BMI) DOCUMENTED: ICD-10-PCS | Mod: CPTII,,, | Performed by: INTERNAL MEDICINE

## 2023-10-09 PROCEDURE — 99214 PR OFFICE/OUTPT VISIT, EST, LEVL IV, 30-39 MIN: ICD-10-PCS | Mod: S$PBB,,, | Performed by: INTERNAL MEDICINE

## 2023-10-09 PROCEDURE — 1159F PR MEDICATION LIST DOCUMENTED IN MEDICAL RECORD: ICD-10-PCS | Mod: CPTII,,, | Performed by: INTERNAL MEDICINE

## 2023-10-09 PROCEDURE — 99999 PR PBB SHADOW E&M-EST. PATIENT-LVL III: ICD-10-PCS | Mod: PBBFAC,,, | Performed by: INTERNAL MEDICINE

## 2023-10-09 PROCEDURE — 1160F PR REVIEW ALL MEDS BY PRESCRIBER/CLIN PHARMACIST DOCUMENTED: ICD-10-PCS | Mod: CPTII,,, | Performed by: INTERNAL MEDICINE

## 2023-10-09 PROCEDURE — 1160F RVW MEDS BY RX/DR IN RCRD: CPT | Mod: CPTII,,, | Performed by: INTERNAL MEDICINE

## 2023-10-09 PROCEDURE — 3074F PR MOST RECENT SYSTOLIC BLOOD PRESSURE < 130 MM HG: ICD-10-PCS | Mod: CPTII,,, | Performed by: INTERNAL MEDICINE

## 2023-10-09 PROCEDURE — 99213 OFFICE O/P EST LOW 20 MIN: CPT | Mod: PBBFAC | Performed by: INTERNAL MEDICINE

## 2023-10-09 RX ORDER — IBUPROFEN 200 MG
1 TABLET ORAL DAILY
Qty: 30 PATCH | Refills: 3 | Status: SHIPPED | OUTPATIENT
Start: 2023-10-09 | End: 2023-12-11

## 2023-10-09 RX ORDER — DIPHENHYDRAMINE HCL 25 MG
4 CAPSULE ORAL
Qty: 100 EACH | Refills: 11 | Status: SHIPPED | OUTPATIENT
Start: 2023-10-09

## 2023-10-09 NOTE — PROGRESS NOTES
Subjective:     Dillon Wilhelm III is a 43 y.o. male . He is a current smoker. 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 He did this for several days in a row to 6/14/2023. On 6/15/2023 he helped a friend move a dresser. On 6/16/2023 he woke up with pain in the left flank. Initially the pain became more pronounced when lifting his left arm but the pain later became more persistent. He presented to the emergency room at [facility] and it was felt he should be admitted for observation. On 6/17/2023 he had an echocardiogram that revealed normal left ventricular size and systolic function. There was trivial aortic regurgitation. The leaflets of the mitral valve were mildly thickened. The systolic pulmonary artery pressure was 46 mmHg. The chest pain has since resolved. No palpitations or weak spells. Feeling well.      Chest Pain   The problem has been resolved. Associated symptoms include a cough and sputum production. Pertinent negatives include no abdominal pain, back pain, claudication, dizziness, fever, headaches, hemoptysis, irregular heartbeat, malaise/fatigue, nausea, near-syncope, numbness, orthopnea, palpitations, PND, shortness of breath, syncope, vomiting or weakness.   Pertinent negatives for past medical history include no muscle weakness.       Review of Systems   Constitutional: Negative for chills, fever and malaise/fatigue.   HENT:  Negative for nosebleeds and tinnitus.    Eyes:  Negative for double vision, vision loss in left eye and vision loss in right eye.   Cardiovascular:  Negative for chest pain, claudication, dyspnea on exertion, irregular heartbeat, leg swelling, near-syncope, orthopnea, palpitations, paroxysmal nocturnal dyspnea and syncope.   Respiratory:  Positive for cough and sputum production. Negative for hemoptysis, shortness of breath and wheezing.    Endocrine: Negative for cold intolerance and heat intolerance.   Hematologic/Lymphatic: Negative  "for bleeding problem. Does not bruise/bleed easily.   Skin:  Negative for color change and rash.   Musculoskeletal:  Negative for back pain, falls, muscle weakness and myalgias.   Gastrointestinal:  Negative for abdominal pain, diarrhea, dysphagia, heartburn, hematemesis, hematochezia, hemorrhoids, jaundice, melena, nausea and vomiting.   Genitourinary:  Negative for dysuria and hematuria.   Neurological:  Negative for dizziness, focal weakness, headaches, light-headedness, loss of balance, numbness, tremors, vertigo and weakness.   Psychiatric/Behavioral:  Negative for altered mental status, depression and memory loss. The patient is not nervous/anxious.    Allergic/Immunologic: Negative for hives and persistent infections.       Current Outpatient Medications on File Prior to Visit   Medication Sig Dispense Refill    amoxicillin-clavulanate 875-125mg (AUGMENTIN) 875-125 mg per tablet Take 1 tablet by mouth 2 (two) times daily. (Patient not taking: Reported on 10/9/2023) 14 tablet 0    ibuprofen (ADVIL,MOTRIN) 600 MG tablet Take 1 tablet (600 mg total) by mouth every 6 (six) hours as needed for Pain. (Patient not taking: Reported on 10/9/2023) 20 tablet 0    naproxen (NAPROSYN) 500 MG tablet Take 1 tablet (500 mg total) by mouth 2 (two) times daily with meals. For pain (Patient not taking: Reported on 10/9/2023) 20 tablet 0     No current facility-administered medications on file prior to visit.        /86   Pulse 69   Ht 5' 11" (1.803 m)   Wt 70.8 kg (156 lb 1.4 oz)   SpO2 96%   BMI 21.77 kg/m²     Objective:     Physical Exam  Constitutional:       General: He is not in acute distress.     Appearance: Normal appearance. He is well-developed. He is not toxic-appearing or diaphoretic.   HENT:      Head: Normocephalic and atraumatic.      Nose: Nose normal.   Eyes:      General:         Right eye: No discharge.         Left eye: No discharge.      Conjunctiva/sclera:      Right eye: Right conjunctiva is " not injected.      Left eye: Left conjunctiva is not injected.      Pupils: Pupils are equal.      Right eye: Pupil is round.      Left eye: Pupil is round.   Neck:      Thyroid: No thyromegaly.      Vascular: No carotid bruit or JVD.   Cardiovascular:      Rate and Rhythm: Normal rate and regular rhythm. No extrasystoles are present.     Chest Wall: PMI is not displaced.      Pulses:           Radial pulses are 2+ on the right side and 2+ on the left side.        Femoral pulses are 2+ on the right side and 2+ on the left side.       Dorsalis pedis pulses are 2+ on the right side and 2+ on the left side.        Posterior tibial pulses are 2+ on the right side and 2+ on the left side.      Heart sounds: S1 normal and S2 normal. Murmur heard.      Systolic murmur is present with a grade of 3/6 at the upper right sternal border.      No gallop.   Pulmonary:      Effort: Pulmonary effort is normal.      Breath sounds: Normal breath sounds.   Abdominal:      Palpations: Abdomen is soft.      Tenderness: There is no abdominal tenderness.   Musculoskeletal:      Cervical back: Neck supple.      Right lower leg: Normal. No swelling. No edema.      Left lower leg: Normal. No swelling. No edema.   Lymphadenopathy:      Head:      Right side of head: No submandibular adenopathy.      Left side of head: No submandibular adenopathy.      Cervical: No cervical adenopathy.   Skin:     General: Skin is warm and dry.      Findings: No rash.      Nails: There is no clubbing.   Neurological:      General: No focal deficit present.      Mental Status: He is alert and oriented to person, place, and time. He is not disoriented.      Cranial Nerves: No cranial nerve deficit.   Psychiatric:         Attention and Perception: Attention normal.         Mood and Affect: Mood and affect normal.         Speech: Speech normal.         Behavior: Behavior normal.         Thought Content: Thought content normal.         Cognition and Memory:  Cognition and memory normal.         Judgment: Judgment normal.         Assessment:      1. Chest pain, unspecified type    2. Pulmonary hypertension due to lung disease    3. Current smoker    4. Abnormal echocardiogram        Plan:      Chest Pain  6/16/2023: Began experience pain left chest. At first affected by moving arm.  ECG no acute changes. Troponin x 3 negative.  6/17/2023: Echo: Normal left ventricular size and systolic function. Trivial AR. Mildly thickened mitral valve. SPAP 46 mmHg.  Suspect a musculoskeletal cause.  Resolved.               2. Pulmonary Hypertension              6/17/2023: Echo: SPAP 46 mmHg.  Suspect related to smoking. Maybe HTN.  Plan follow up after he has been a non smoker for some time.     3. Current Smoker   1995: Began smoking. 0.3 ppd.              Strongly advised to quit.   10/9/2023: Rx nicotine patches 21 mg and gums.    4. Primary Care   YOSEF Slade.    F/u 2 months.    Genaro Son M.D.

## 2023-11-30 ENCOUNTER — HOSPITAL ENCOUNTER (EMERGENCY)
Facility: OTHER | Age: 43
Discharge: HOME | End: 2023-11-30
Attending: EMERGENCY MEDICINE
Payer: MEDICAID

## 2023-11-30 VITALS
TEMPERATURE: 98 F | SYSTOLIC BLOOD PRESSURE: 130 MMHG | HEIGHT: 71 IN | HEART RATE: 77 BPM | WEIGHT: 155 LBS | OXYGEN SATURATION: 97 % | DIASTOLIC BLOOD PRESSURE: 91 MMHG | RESPIRATION RATE: 18 BRPM | BODY MASS INDEX: 21.7 KG/M2

## 2023-11-30 DIAGNOSIS — S91.339A: Primary | ICD-10-CM

## 2023-11-30 DIAGNOSIS — T14.8XXA PUNCTURE WOUND: ICD-10-CM

## 2023-11-30 PROCEDURE — 99284 EMERGENCY DEPT VISIT MOD MDM: CPT

## 2023-11-30 PROCEDURE — 25000003 PHARM REV CODE 250: Performed by: NURSE PRACTITIONER

## 2023-11-30 PROCEDURE — 90471 IMMUNIZATION ADMIN: CPT | Performed by: NURSE PRACTITIONER

## 2023-11-30 PROCEDURE — 63600175 PHARM REV CODE 636 W HCPCS: Performed by: NURSE PRACTITIONER

## 2023-11-30 PROCEDURE — 90715 TDAP VACCINE 7 YRS/> IM: CPT | Performed by: NURSE PRACTITIONER

## 2023-11-30 RX ORDER — IBUPROFEN 600 MG/1
600 TABLET ORAL
Status: COMPLETED | OUTPATIENT
Start: 2023-11-30 | End: 2023-11-30

## 2023-11-30 RX ORDER — CIPROFLOXACIN 500 MG/1
500 TABLET ORAL
Status: COMPLETED | OUTPATIENT
Start: 2023-11-30 | End: 2023-11-30

## 2023-11-30 RX ORDER — IBUPROFEN 600 MG/1
600 TABLET ORAL EVERY 6 HOURS PRN
Qty: 20 TABLET | Refills: 0 | Status: SHIPPED | OUTPATIENT
Start: 2023-11-30

## 2023-11-30 RX ORDER — MUPIROCIN 20 MG/G
1 OINTMENT TOPICAL
Status: COMPLETED | OUTPATIENT
Start: 2023-11-30 | End: 2023-11-30

## 2023-11-30 RX ORDER — CIPROFLOXACIN 500 MG/1
500 TABLET ORAL 2 TIMES DAILY
Qty: 20 TABLET | Refills: 0 | Status: SHIPPED | OUTPATIENT
Start: 2023-11-30 | End: 2023-12-10

## 2023-11-30 RX ORDER — ACETAMINOPHEN 500 MG
1000 TABLET ORAL
Status: COMPLETED | OUTPATIENT
Start: 2023-11-30 | End: 2023-11-30

## 2023-11-30 RX ORDER — CEPHALEXIN 500 MG/1
500 CAPSULE ORAL EVERY 12 HOURS
Qty: 20 CAPSULE | Refills: 0 | Status: SHIPPED | OUTPATIENT
Start: 2023-11-30 | End: 2023-12-10

## 2023-11-30 RX ORDER — CEPHALEXIN 500 MG/1
500 CAPSULE ORAL
Status: COMPLETED | OUTPATIENT
Start: 2023-11-30 | End: 2023-11-30

## 2023-11-30 RX ORDER — ACETAMINOPHEN 500 MG
1000 TABLET ORAL EVERY 6 HOURS PRN
Qty: 30 TABLET | Refills: 0 | Status: SHIPPED | OUTPATIENT
Start: 2023-11-30

## 2023-11-30 RX ADMIN — MUPIROCIN 1 TUBE: 20 OINTMENT TOPICAL at 08:11

## 2023-11-30 RX ADMIN — TETANUS TOXOID, REDUCED DIPHTHERIA TOXOID AND ACELLULAR PERTUSSIS VACCINE, ADSORBED 0.5 ML: 5; 2.5; 8; 8; 2.5 SUSPENSION INTRAMUSCULAR at 08:11

## 2023-11-30 RX ADMIN — CIPROFLOXACIN 500 MG: 500 TABLET, FILM COATED ORAL at 08:11

## 2023-11-30 RX ADMIN — ACETAMINOPHEN 1000 MG: 500 TABLET ORAL at 08:11

## 2023-11-30 RX ADMIN — IBUPROFEN 600 MG: 600 TABLET, FILM COATED ORAL at 08:11

## 2023-11-30 RX ADMIN — CEPHALEXIN 500 MG: 500 CAPSULE ORAL at 08:11

## 2023-11-30 NOTE — Clinical Note
"Dillon Chi"Choco was seen and treated in our emergency department on 11/30/2023.  He may return to work on 12/04/2023.       If you have any questions or concerns, please don't hesitate to call.      Ayush Calix NP"
soft/nondistended

## 2023-12-01 NOTE — FIRST PROVIDER EVALUATION
Emergency Department TeleTriage Encounter Note      CHIEF COMPLAINT    Chief Complaint   Patient presents with    stepped on nail     Pt reporting stepping on nail with R foot. Puncture wound reported. Last tetanus unknown.        VITAL SIGNS   Initial Vitals [11/30/23 1800]   BP Pulse Resp Temp SpO2   (!) 130/91 77 18 98.1 °F (36.7 °C) 97 %      MAP       --            ALLERGIES    Review of patient's allergies indicates:  No Known Allergies    PROVIDER TRIAGE NOTE  Verbal consent for the teletriage evaluation was given by the patient at the start of the evaluation.  All efforts will be made to maintain patient's privacy during the evaluation.      This is a teletriage evaluation of a 43 y.o. male presenting to the ED with c/o stepped on nail with right foot.  Unsure of last tetanus. Limited physical exam via telehealth: The patient is awake, alert, answering questions appropriately and is not in respiratory distress.  As the Teletriage provider, I performed an initial assessment and ordered appropriate labs and imaging studies, if any, to facilitate the patient's care once placed in the ED. Once a room is available, care and a full evaluation will be completed by an alternate ED provider.  Any additional orders and the final disposition will be determined by that provider.  All imaging and labs will not be followed-up by the Teletriage Team, including myself.          ORDERS  Labs Reviewed - No data to display    ED Orders (720h ago, onward)      Start Ordered     Status Ordering Provider    12/01/23 0600 11/30/23 1841  Wound care routine - Clean wound  Daily        Comments: Clean Wound    Ordered FROYLAN CALVO    12/01/23 0600 11/30/23 1841  Wound care routine - Irrigate wound  Daily        Comments: Irrigate Wound    Ordered FROYLAN CALVO    11/30/23 1845 11/30/23 1841  neomycin-bacitracnZn-polymyxnB packet  ED 1 Time         Ordered FROYLAN CALVO    11/30/23 1841 11/30/23 1841  X-Ray Foot Complete Right  1  time imaging         Ordered FROYLAN CALVO    11/30/23 1841 11/30/23 1841  Change dressing - apply dry sterile dressing  Once        Comments: Apply dry sterile dressing.    Ordered FROYLAN CALVO    11/30/23 1815 11/30/23 1806  Tdap (BOOSTRIX) vaccine injection 0.5 mL  ED 1 Time         Ordered BETTYE WILKES.    11/30/23 1814 11/30/23 1813  X-Ray Foot Complete Right  1 time imaging         In process BETTYE WILKES.              Virtual Visit Note: The provider triage portion of this emergency department evaluation and documentation was performed via [employer], a HIPAA-compliant telemedicine application, in concert with a tele-presenter in the room. A face to face patient evaluation with one of my colleagues will occur once the patient is placed in an emergency department room.      DISCLAIMER: This note was prepared with [employer] voice recognition transcription software. Garbled syntax, mangled pronouns, and other bizarre constructions may be attributed to that software system.

## 2023-12-01 NOTE — PLAN OF CARE
"Maxi faxed referral to [facility]. Patient will be contacted for an appt.        Your fax has been successfully sent to [phone] at [phone].  ------------------------------------------------------------  From: [id number]  ------------------------------------------------------------  12/1/2023 12:22:21 PM Transmission Record          Sent to [phone] with remote ID "[email or web address]            "          Result: (0/339;0/0) Success          Page record: 1 - 4          Elapsed time: 01:40 on channel 10    "

## 2023-12-01 NOTE — ED PROVIDER NOTES
"     Source of History:  Patient    Chief complaint:  stepped on nail (Pt reporting stepping on nail with R foot. Puncture wound reported. Last tetanus unknown. )      HPI:  Dillon Wilhelm III is a 43 y.o. male presenting after a nail went through his shoe and into his foot.  Patient was in tennis shoes when he stepped on a wooden board with a nail in it.  He states the nail went through his shoe and almost completely through his 2nd toe.  He pulled out the nail immediately.  He was able to control the bleeding.  He is able to ambulate.  Denies sensation of foreign body.  He cleaned the wound at home prior to arriving in the emergency department. No numbness or tingling. Unsure of last Tdap.    This is the extent to the patients complaints today here in the emergency department.    ROS: As per HPI and below:  General: No fever.  No chills.  Eyes: No visual changes.   ENT: No sore throat. No ear pain.  Urinary: No abnormal urination.  MSK: + right foot pain  Integument: No rashes or lesions.    Review of patient's allergies indicates:  No Known Allergies    PMH:  As per HPI and below:  Past Medical History:   Diagnosis Date    Bronchitis     GERD (gastroesophageal reflux disease)     Irregular heart beat      No past surgical history on file.    Social History     Tobacco Use    Smoking status: Every Day     Types: Cigarettes   Substance Use Topics    Alcohol use: Yes     Comment: socially     Drug use: Yes     Types: Marijuana       Physical Exam:    BP (!) 130/91 (BP Location: Left arm, Patient Position: Sitting)   Pulse 77   Temp 98.1 °F (36.7 °C) (Oral)   Resp 18   Ht 5' 11" (1.803 m)   Wt 70.3 kg (155 lb)   SpO2 97%   BMI 21.62 kg/m²   Nursing note and vital signs reviewed.  Appearance: No acute distress.  Eyes: No conjunctival injection.  ENT: Normal phonation.  Musculoskeletal: Puncture wound to the sole on 2nd toe, no drainage, surrounding erythema, hemostasis achieved  Skin: No rashes seen.  Good turgor.  " No abrasions.  No ecchymoses.  Mental Status:  Alert and oriented x 3.  Appropriate, conversant.        Initial Impression/ Differential Dx:  Differential Diagnosis includes, but is not limited to:  Fracture, dislocation, compartment syndrome, nerve injury/palsy, vascular injury, septic joint, cellulitis, foreign body,     MDM:    Urgent evaluation of 43 y.o. male after stepping on a nail earlier this afternoon.  Patient is afebrile, not toxic appearing and hemodynamically stable.  On exam patient has a puncture wound to the sole of his 2nd toe.  Patient is able to weightbear and ambulate.  On x-ray small punctate possible foreign body or calcification projects over the soft tissues of the great toe and 2nd toe in uncertain location.  On exam, no visible foreign body and not expelled with light expression of puncture site.  Given small size of likely retained foreign body as it is consistent with the area of puncture wound, unlikely to be able to extract.  No purulence or surrounding erythema to suggest abscess or cellulitis.  No fracture on xray.  As the nail went through the patient's shoe, will cover for Staph aureus as well as Pseudomonas aeruginosa with Keflex and Cipro.  Patient counseled on wound care and instructed to obtain podiatry follow-up as soon as possible, referral placed.  Will also have [place]  aid patient in obtaining podiatry appointment.  Counseled patient to have a low threshold for returning to the ED if symptoms worsen, he develops any purulence or surrounding erythema or systemic signs such as fever and chills.   Counseled him to continue taking Tylenol and ibuprofen as needed for pain.  Tdap was given as unknown last administration and patient discharged home in good condition with strict return to ED precautions. Patient educated on signs and symptoms to monitor for and that would warrant return to ED. Patient and spouse verbalized understanding agrees with treatment plan.  All questions and concerns addressed.                    Diagnostic Impression:    1. Nail wound of foot, initial encounter    2. Puncture wound         ED Disposition Condition    Discharge Good            ED Prescriptions       Medication Sig Dispense Start Date End Date Auth. Provider    acetaminophen (TYLENOL) 500 MG tablet Take 2 tablets (1,000 mg total) by mouth every 6 (six) hours as needed for Pain. 30 tablet 11/30/2023 -- Ayush Calix NP    ibuprofen (ADVIL,MOTRIN) 600 MG tablet Take 1 tablet (600 mg total) by mouth every 6 (six) hours as needed for Pain. 20 tablet 11/30/2023 -- Ayush Calix NP    cephALEXin (KEFLEX) 500 MG capsule Take 1 capsule (500 mg total) by mouth every 12 (twelve) hours. for 10 days 20 capsule 11/30/2023 12/10/2023 Ayush Calix NP    ciprofloxacin HCl (CIPRO) 500 MG tablet Take 1 tablet (500 mg total) by mouth 2 (two) times daily. for 10 days 20 tablet 11/30/2023 12/10/2023 Ayush Calix NP          Follow-up Information       Follow up With Specialties Details Why Contact Info Additional Information    [facility] Schedule an appointment as soon as possible for a visit   [address] [address] [phone] [facility] in the [place]    [facility] - Podiatry Podiatry Schedule an appointment as soon as possible for a visit   [address] [facility] [address]  [facility] [address] [phone]     Podiatry, [facility] & Podiatry Schedule an appointment as soon as possible for a visit   [address] [facility] [address]  [phone]       [facility] - Emergency Dept Emergency Medicine  If symptoms worsen [address]  [facility] [address] [phone]              Ayush Calix NP  12/01/23 0003

## 2023-12-11 ENCOUNTER — OFFICE VISIT (OUTPATIENT)
Dept: CARDIOLOGY | Facility: CLINIC | Age: 43
End: 2023-12-11
Attending: INTERNAL MEDICINE
Payer: MEDICAID

## 2023-12-11 VITALS
WEIGHT: 160.69 LBS | HEART RATE: 62 BPM | HEIGHT: 71 IN | DIASTOLIC BLOOD PRESSURE: 80 MMHG | OXYGEN SATURATION: 98 % | BODY MASS INDEX: 22.5 KG/M2 | SYSTOLIC BLOOD PRESSURE: 106 MMHG

## 2023-12-11 DIAGNOSIS — Z87.891 FORMER SMOKER: ICD-10-CM

## 2023-12-11 DIAGNOSIS — Z87.898 HISTORY OF CHEST PAIN: ICD-10-CM

## 2023-12-11 DIAGNOSIS — I27.23 PULMONARY HYPERTENSION DUE TO LUNG DISEASE: ICD-10-CM

## 2023-12-11 PROCEDURE — 99999 PR PBB SHADOW E&M-EST. PATIENT-LVL III: CPT | Mod: PBBFAC,,, | Performed by: INTERNAL MEDICINE

## 2023-12-11 PROCEDURE — 99214 PR OFFICE/OUTPT VISIT, EST, LEVL IV, 30-39 MIN: ICD-10-PCS | Mod: S$PBB,,, | Performed by: INTERNAL MEDICINE

## 2023-12-11 PROCEDURE — 1159F PR MEDICATION LIST DOCUMENTED IN MEDICAL RECORD: ICD-10-PCS | Mod: CPTII,,, | Performed by: INTERNAL MEDICINE

## 2023-12-11 PROCEDURE — 3079F PR MOST RECENT DIASTOLIC BLOOD PRESSURE 80-89 MM HG: ICD-10-PCS | Mod: CPTII,,, | Performed by: INTERNAL MEDICINE

## 2023-12-11 PROCEDURE — 3074F SYST BP LT 130 MM HG: CPT | Mod: CPTII,,, | Performed by: INTERNAL MEDICINE

## 2023-12-11 PROCEDURE — 1159F MED LIST DOCD IN RCRD: CPT | Mod: CPTII,,, | Performed by: INTERNAL MEDICINE

## 2023-12-11 PROCEDURE — 3008F BODY MASS INDEX DOCD: CPT | Mod: CPTII,,, | Performed by: INTERNAL MEDICINE

## 2023-12-11 PROCEDURE — 3008F PR BODY MASS INDEX (BMI) DOCUMENTED: ICD-10-PCS | Mod: CPTII,,, | Performed by: INTERNAL MEDICINE

## 2023-12-11 PROCEDURE — 3079F DIAST BP 80-89 MM HG: CPT | Mod: CPTII,,, | Performed by: INTERNAL MEDICINE

## 2023-12-11 PROCEDURE — 1160F RVW MEDS BY RX/DR IN RCRD: CPT | Mod: CPTII,,, | Performed by: INTERNAL MEDICINE

## 2023-12-11 PROCEDURE — 1160F PR REVIEW ALL MEDS BY PRESCRIBER/CLIN PHARMACIST DOCUMENTED: ICD-10-PCS | Mod: CPTII,,, | Performed by: INTERNAL MEDICINE

## 2023-12-11 PROCEDURE — 3074F PR MOST RECENT SYSTOLIC BLOOD PRESSURE < 130 MM HG: ICD-10-PCS | Mod: CPTII,,, | Performed by: INTERNAL MEDICINE

## 2023-12-11 PROCEDURE — 99214 OFFICE O/P EST MOD 30 MIN: CPT | Mod: S$PBB,,, | Performed by: INTERNAL MEDICINE

## 2023-12-11 PROCEDURE — 99999 PR PBB SHADOW E&M-EST. PATIENT-LVL III: ICD-10-PCS | Mod: PBBFAC,,, | Performed by: INTERNAL MEDICINE

## 2023-12-11 PROCEDURE — 99213 OFFICE O/P EST LOW 20 MIN: CPT | Mod: PBBFAC | Performed by: INTERNAL MEDICINE

## 2023-12-11 RX ORDER — IBUPROFEN 200 MG
1 TABLET ORAL DAILY
Qty: 14 PATCH | Refills: 2 | Status: SHIPPED | OUTPATIENT
Start: 2023-12-11

## 2023-12-11 RX ORDER — NICOTINE 7MG/24HR
1 PATCH, TRANSDERMAL 24 HOURS TRANSDERMAL DAILY
Qty: 14 PATCH | Refills: 2 | Status: SHIPPED | OUTPATIENT
Start: 2023-12-11

## 2023-12-11 NOTE — LETTER
December 11, 2023      [facility]  [address] [place] [address], [address]  [facility] [address]  Phone: [phone]  Fax: [phone]       Patient: Dillon Wilhelm   YOB: 1980  Date of Visit: 12/11/2023    To Whom It May Concern:    Marie Wilhelm  was at [facility] on 12/11/2023. The patient may return to work/school on 12/11/23 with no restrictions. If you have any questions or concerns, or if I can be of further assistance, please do not hesitate to contact me.    Sincerely,    Genaro Son MD

## 2023-12-11 NOTE — PROGRESS NOTES
Subjective:     Dillon Wilhelm III is a 43 y.o. male . He is a former smoker. 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 He did this for several days in a row to 6/14/2023. On 6/15/2023 he helped a friend move a dresser. On 6/16/2023 he woke up with pain in the left flank. Initially the pain became more pronounced when lifting his left arm but the pain later became more persistent. He presented to the emergency room at [facility] and it was felt he should be admitted for observation. On 6/17/2023 he had an echocardiogram that revealed normal left ventricular size and systolic function. There was trivial aortic regurgitation. The leaflets of the mitral valve were mildly thickened. The systolic pulmonary artery pressure was 46 mmHg. The chest pain resolved. He quit smoking. No palpitations or weak spells. Feeling well.      Chest Pain   The problem has been resolved. Associated symptoms include a cough and sputum production. Pertinent negatives include no abdominal pain, back pain, claudication, dizziness, fever, headaches, hemoptysis, irregular heartbeat, malaise/fatigue, nausea, near-syncope, numbness, orthopnea, palpitations, PND, shortness of breath, syncope, vomiting or weakness.   Pertinent negatives for past medical history include no muscle weakness.       Review of Systems   Constitutional: Negative for chills, fever and malaise/fatigue.   HENT:  Negative for nosebleeds and tinnitus.    Eyes:  Negative for double vision, vision loss in left eye and vision loss in right eye.   Cardiovascular:  Negative for chest pain, claudication, dyspnea on exertion, irregular heartbeat, leg swelling, near-syncope, orthopnea, palpitations, paroxysmal nocturnal dyspnea and syncope.   Respiratory:  Positive for cough and sputum production. Negative for hemoptysis, shortness of breath and wheezing.    Endocrine: Negative for cold intolerance and heat intolerance.   Hematologic/Lymphatic:  "Negative for bleeding problem. Does not bruise/bleed easily.   Skin:  Negative for color change and rash.   Musculoskeletal:  Negative for back pain, falls, muscle weakness and myalgias.   Gastrointestinal:  Negative for abdominal pain, diarrhea, dysphagia, heartburn, hematemesis, hematochezia, hemorrhoids, jaundice, melena, nausea and vomiting.   Genitourinary:  Negative for dysuria and hematuria.   Neurological:  Negative for dizziness, focal weakness, headaches, light-headedness, loss of balance, numbness, tremors, vertigo and weakness.   Psychiatric/Behavioral:  Negative for altered mental status, depression and memory loss. The patient is not nervous/anxious.    Allergic/Immunologic: Negative for hives and persistent infections.       Current Outpatient Medications on File Prior to Visit   Medication Sig Dispense Refill    acetaminophen (TYLENOL) 500 MG tablet Take 2 tablets (1,000 mg total) by mouth every 6 (six) hours as needed for Pain. 30 tablet 0    ibuprofen (ADVIL,MOTRIN) 600 MG tablet Take 1 tablet (600 mg total) by mouth every 6 (six) hours as needed for Pain. 20 tablet 0    nicotine (NICODERM CQ) 21 mg/24 hr Place 1 patch onto the skin once daily. 30 patch 3    nicotine polacrilex (NICORETTE) 4 MG Gum Take 1 each (4 mg total) by mouth as needed. 100 each 11    [] cephALEXin (KEFLEX) 500 MG capsule Take 1 capsule (500 mg total) by mouth every 12 (twelve) hours. for 10 days 20 capsule 0    [] ciprofloxacin HCl (CIPRO) 500 MG tablet Take 1 tablet (500 mg total) by mouth 2 (two) times daily. for 10 days 20 tablet 0     No current facility-administered medications on file prior to visit.        /80   Pulse 62   Ht 5' 11" (1.803 m)   Wt 72.9 kg (160 lb 11.5 oz)   SpO2 98%   BMI 22.42 kg/m²     Objective:     Physical Exam  Constitutional:       General: He is not in acute distress.     Appearance: Normal appearance. He is well-developed. He is not toxic-appearing or diaphoretic. "   HENT:      Head: Normocephalic and atraumatic.      Nose: Nose normal.   Eyes:      General:         Right eye: No discharge.         Left eye: No discharge.      Conjunctiva/sclera:      Right eye: Right conjunctiva is not injected.      Left eye: Left conjunctiva is not injected.      Pupils: Pupils are equal.      Right eye: Pupil is round.      Left eye: Pupil is round.   Neck:      Thyroid: No thyromegaly.      Vascular: No carotid bruit or JVD.   Cardiovascular:      Rate and Rhythm: Normal rate and regular rhythm. No extrasystoles are present.     Chest Wall: PMI is not displaced.      Pulses:           Radial pulses are 2+ on the right side and 2+ on the left side.        Femoral pulses are 2+ on the right side and 2+ on the left side.       Dorsalis pedis pulses are 2+ on the right side and 2+ on the left side.        Posterior tibial pulses are 2+ on the right side and 2+ on the left side.      Heart sounds: S1 normal and S2 normal. Murmur heard.      Systolic murmur is present with a grade of 3/6 at the upper right sternal border.      No gallop.   Pulmonary:      Effort: Pulmonary effort is normal.      Breath sounds: Normal breath sounds.   Abdominal:      Palpations: Abdomen is soft.      Tenderness: There is no abdominal tenderness.   Musculoskeletal:      Cervical back: Neck supple.      Right lower leg: Normal. No swelling. No edema.      Left lower leg: Normal. No swelling. No edema.   Lymphadenopathy:      Head:      Right side of head: No submandibular adenopathy.      Left side of head: No submandibular adenopathy.      Cervical: No cervical adenopathy.   Skin:     General: Skin is warm and dry.      Findings: No rash.      Nails: There is no clubbing.   Neurological:      General: No focal deficit present.      Mental Status: He is alert and oriented to person, place, and time. He is not disoriented.      Cranial Nerves: No cranial nerve deficit.   Psychiatric:         Attention and  Perception: Attention normal.         Mood and Affect: Mood and affect normal.         Speech: Speech normal.         Behavior: Behavior normal.         Thought Content: Thought content normal.         Cognition and Memory: Cognition and memory normal.         Judgment: Judgment normal.         Assessment:      1. History of chest pain    2. Pulmonary hypertension due to lung disease    3. Former smoker        Plan:      History of Chest Pain  6/16/2023: Began experience pain left chest. At first affected by moving arm.  ECG no acute changes. Troponin x 3 negative.  6/17/2023: Echo: Normal left ventricular size and systolic function. Trivial AR. Mildly thickened mitral valve. SPAP 46 mmHg.  Suspect a musculoskeletal cause.  Resolved.               2. Pulmonary Hypertension              6/17/2023: Echo: SPAP 46 mmHg.  Suspect related to smoking. Maybe HTN.  Plan follow up after he has been a non smoker for some time.  He wants follow up - in 6/2024 plan Echo.     3. Former Smoker   1995: Began smoking. 0.3 ppd.   10/9/2023: Quit smoking.    4. Primary Care   [facility].    F/u 6 months.    Genaro Son M.D.

## 2025-01-30 ENCOUNTER — HOSPITAL ENCOUNTER (EMERGENCY)
Facility: OTHER | Age: 45
Discharge: HOME | End: 2025-01-30
Attending: EMERGENCY MEDICINE
Payer: COMMERCIAL

## 2025-01-30 VITALS
DIASTOLIC BLOOD PRESSURE: 84 MMHG | SYSTOLIC BLOOD PRESSURE: 131 MMHG | HEIGHT: 70 IN | RESPIRATION RATE: 18 BRPM | BODY MASS INDEX: 23.48 KG/M2 | HEART RATE: 77 BPM | TEMPERATURE: 98 F | OXYGEN SATURATION: 96 % | WEIGHT: 164 LBS

## 2025-01-30 DIAGNOSIS — M79.645 PAIN OF LEFT THUMB: Primary | ICD-10-CM

## 2025-01-30 PROCEDURE — 99283 EMERGENCY DEPT VISIT LOW MDM: CPT | Mod: 25

## 2025-01-30 RX ORDER — NAPROXEN 500 MG/1
500 TABLET ORAL 2 TIMES DAILY WITH MEALS
Qty: 14 TABLET | Refills: 0 | Status: SHIPPED | OUTPATIENT
Start: 2025-01-30 | End: 2025-02-06

## 2025-01-30 NOTE — ED TRIAGE NOTES
Pt. Is a 44 yr. Old male. Pt. Complains of left thumb pain x 1 week. Pt. Reports unable to move it. Pt. Denies any recent trauma. Pt. Is alert and ABC's are intact. GCS of 15.

## 2025-01-30 NOTE — Clinical Note
"Dillon Chi"Choco was seen and treated in our emergency department on 1/30/2025.  He may return to work on 01/31/2025.       If you have any questions or concerns, please don't hesitate to call.      Will S LPN    "

## 2025-01-30 NOTE — DISCHARGE INSTRUCTIONS
Thank you for choosing [facility]!     Our goal in the Emergency Department is to always provide outstanding medical care. You may receive a survey by mail or e-mail in the next week regarding your experience today. We would greatly appreciate you completing and returning the survey. Your feedback provides us with a way to recognize our staff who provide very good care, and it helps us learn how to improve when your experience was below our aspiration of excellence.      It is important to remember that some problems are difficult to diagnose and may not be found during your first visit. Be sure to follow up with your primary care doctor and review any labs/imaging that was performed during your visit with them. If you do not have a primary care doctor, you may contact the one listed on your discharge paperwork, or you may also call the [facility] Appointment Desk at [phone] to schedule an appointment.     All medications may potentially have side effects and it is impossible to predict which medications may give you side effects. If you feel that you are having a negative effect of any medication you should immediately stop taking them and seek medical attention.  Do not drive or make any important decisions for 24 hours if you have received any pain medications, sedatives or mood altering drugs during your ER visit.    We appreciate you trusting us with your medical care. We will be happy to take care of you for all of your future medical needs. You may return to the ER at any time for any new/concerning symptoms, worsening condition, or failure to improve. We hope you feel better soon.     Sincerely,    Derek Griffin Jr., MD  Board-Certified Emergency Medicine Physician  [facility]

## 2025-02-13 ENCOUNTER — OFFICE VISIT (OUTPATIENT)
Dept: ORTHOPEDICS | Facility: CLINIC | Age: 45
End: 2025-02-13
Payer: COMMERCIAL

## 2025-02-13 DIAGNOSIS — M79.645 PAIN OF LEFT THUMB: ICD-10-CM

## 2025-02-13 DIAGNOSIS — M65.312 TRIGGER FINGER OF LEFT THUMB: Primary | ICD-10-CM

## 2025-02-13 PROCEDURE — 1160F RVW MEDS BY RX/DR IN RCRD: CPT | Mod: CPTII,S$GLB,, | Performed by: SPECIALIST/TECHNOLOGIST

## 2025-02-13 PROCEDURE — 99999 PR PBB SHADOW E&M-EST. PATIENT-LVL III: CPT | Mod: PBBFAC,,, | Performed by: SPECIALIST/TECHNOLOGIST

## 2025-02-13 PROCEDURE — 99204 OFFICE O/P NEW MOD 45 MIN: CPT | Mod: 25,S$GLB,, | Performed by: SPECIALIST/TECHNOLOGIST

## 2025-02-13 PROCEDURE — 76942 ECHO GUIDE FOR BIOPSY: CPT | Mod: S$GLB,,, | Performed by: SPECIALIST/TECHNOLOGIST

## 2025-02-13 PROCEDURE — 20550 NJX 1 TENDON SHEATH/LIGAMENT: CPT | Mod: LT,S$GLB,, | Performed by: SPECIALIST/TECHNOLOGIST

## 2025-02-13 PROCEDURE — 1159F MED LIST DOCD IN RCRD: CPT | Mod: CPTII,S$GLB,, | Performed by: SPECIALIST/TECHNOLOGIST

## 2025-02-13 RX ORDER — DEXAMETHASONE SODIUM PHOSPHATE 4 MG/ML
4 INJECTION, SOLUTION INTRA-ARTICULAR; INTRALESIONAL; INTRAMUSCULAR; INTRAVENOUS; SOFT TISSUE
Status: DISCONTINUED | OUTPATIENT
Start: 2025-02-13 | End: 2025-02-13 | Stop reason: HOSPADM

## 2025-02-13 RX ADMIN — DEXAMETHASONE SODIUM PHOSPHATE 4 MG: 4 INJECTION, SOLUTION INTRA-ARTICULAR; INTRALESIONAL; INTRAMUSCULAR; INTRAVENOUS; SOFT TISSUE at 01:02

## 2025-02-13 NOTE — PROGRESS NOTES
Patient ID: Dillon Wilhelm III is a 44 y.o. male.    Chief Complaint: Pain of the Left Hand    History of Present Illness    CHIEF COMPLAINT:  - Follow-up for stuck left thumb.    HPI:  [facility] with complaints of a stuck left thumb for approximately two weeks. He reports difficulty bending the thumb, particularly at the tip, stating it is stuck and he is attempting to bend it. His left thumb is tender. He denies any recent injuries, pops, or snaps related to the thumb, as well as any episodes of the thumb getting locked down and needing to pop it back over. As a , he is concerned about the impact on his work, noting it is good for working. Dillon denies any numbness or tingling in the affected thumb. He reports a history of smoking cigarettes but has since quit.    WORK STATUS:  - Works as a   - Occupation requires use of hands, relevant to current thumb issue    SOCIAL HISTORY:  - Smoking: Former smoker      ROS:  ROS as indicated in HPI.          Hand/Wrist Musculoskeletal Exam    Inspection    Right      Erythema: none      Ecchymosis: none      Edema: none      Deformity: none    Left      Erythema: none      Ecchymosis: none      Edema: none      Deformity: none    Palpation    Left      Triggering: thumb      Thumb tenderness to palpation: A1 pulley    Range of Motion        Range of motion additional comments: Able to make a composite fist.    Neurovascular    Right       Radial pulse: normal      Capillary refill: brisk      Ulnar nerve sensory distribution: normal      Median nerve sensory distribution: normal      Superficial radial nerve sensory distribution: normal    Left       Radial pulse: normal      Capillary refill: brisk      Ulnar nerve sensory distribution: normal      Median nerve sensory distribution: normal      Superficial radial nerve sensory distribution: normal    Neurovascular additional comments:         Physical Exam    MSK: Hand/Wrist - Left: Tenderness in  left thumb. Normal sensation in hands. Normal range of motion in fingers.           IMAGING  Left hand XR  Personal interpretation of the XR reveals no signs of fractures or dislocations      PLAN  Assessment & Plan    - Administered left thumb steroid injection today to treat trigger thumb.  - Discussed potential future surgical option involving a small incision to cut the pulley if the injection is not effective.          PROCEDURE NOTE  L Thumb Tendon Sheath    Date/Time: 2/13/2025 1:00 PM    Performed by: Angel Gonzalez PA-C  Authorized by: Angel Gonzalez PA-C    Consent Done?:  Yes (Verbal)  Indications:  Pain  Timeout: prior to procedure the correct patient, procedure, and site was verified    Local anesthesia used?: Yes    Anesthesia:  Local infiltration  Local anesthetic:  Lidocaine 1% without epinephrine  Anesthetic total (ml):  0.5    Location:  Thumb  Site:  L thumb flexor tendon sheath  Ultrasonic guidance for needle placement?: Yes (Ultrasound guidance was utilized for needle localization. Dynamic visualization of the needle was continuous throughout the procedure and maintained accurate placement. Images were saved and stored for documentation.)    Needle size:  25 G  Approach:  Volar  Medications:  4 mg dexAMETHasone 4 mg/mL  Patient tolerance:  Patient tolerated the procedure well with no immediate complications          Ludwin Gonzalez PA-C, ATC  Hand and Upper Extremity   [facility]    This note was generated with the assistance of ambient listening technology. Verbal consent was obtained by the patient and accompanying visitor(s) for the recording of patient appointment to facilitate this note. I attest to having reviewed and edited the generated note for accuracy, though some syntax or spelling errors may persist. Please contact the author of this note for any clarification.

## 2025-04-27 ENCOUNTER — HOSPITAL ENCOUNTER (EMERGENCY)
Facility: HOSPITAL | Age: 45
Discharge: HOME | End: 2025-04-27
Attending: EMERGENCY MEDICINE
Payer: COMMERCIAL

## 2025-04-27 VITALS
SYSTOLIC BLOOD PRESSURE: 114 MMHG | RESPIRATION RATE: 15 BRPM | TEMPERATURE: 97 F | HEART RATE: 61 BPM | OXYGEN SATURATION: 95 % | WEIGHT: 164 LBS | BODY MASS INDEX: 23.48 KG/M2 | HEIGHT: 70 IN | DIASTOLIC BLOOD PRESSURE: 74 MMHG

## 2025-04-27 DIAGNOSIS — R07.9 CHEST PAIN: ICD-10-CM

## 2025-04-27 DIAGNOSIS — R07.89 CHEST WALL PAIN: Primary | ICD-10-CM

## 2025-04-27 LAB
ABSOLUTE EOSINOPHIL (OHS): 0.1 K/UL
ABSOLUTE MONOCYTE (OHS): 0.5 K/UL (ref 0.3–1)
ABSOLUTE NEUTROPHIL COUNT (OHS): 3.37 K/UL (ref 1.8–7.7)
ALBUMIN SERPL BCP-MCNC: 3.4 G/DL (ref 3.5–5.2)
ALP SERPL-CCNC: 85 UNIT/L (ref 40–150)
ALT SERPL W/O P-5'-P-CCNC: 17 UNIT/L (ref 10–44)
ANION GAP (OHS): 10 MMOL/L (ref 8–16)
AST SERPL-CCNC: 27 UNIT/L (ref 11–45)
BASOPHILS # BLD AUTO: 0.04 K/UL
BASOPHILS NFR BLD AUTO: 0.6 %
BILIRUB SERPL-MCNC: 1.1 MG/DL (ref 0.1–1)
BNP SERPL-MCNC: 31 PG/ML (ref 0–99)
BUN SERPL-MCNC: 9 MG/DL (ref 6–20)
CALCIUM SERPL-MCNC: 8.6 MG/DL (ref 8.7–10.5)
CHLORIDE SERPL-SCNC: 106 MMOL/L (ref 95–110)
CO2 SERPL-SCNC: 24 MMOL/L (ref 23–29)
CREAT SERPL-MCNC: 0.8 MG/DL (ref 0.5–1.4)
ERYTHROCYTE [DISTWIDTH] IN BLOOD BY AUTOMATED COUNT: 12 % (ref 11.5–14.5)
GFR SERPLBLD CREATININE-BSD FMLA CKD-EPI: >60 ML/MIN/1.73/M2
GLUCOSE SERPL-MCNC: 120 MG/DL (ref 70–110)
HCT VFR BLD AUTO: 38.9 % (ref 40–54)
HCV AB SERPL QL IA: NORMAL
HGB BLD-MCNC: 12.8 GM/DL (ref 14–18)
HIV 1+2 AB+HIV1 P24 AG SERPL QL IA: NORMAL
IMM GRANULOCYTES # BLD AUTO: 0.02 K/UL (ref 0–0.04)
IMM GRANULOCYTES NFR BLD AUTO: 0.3 % (ref 0–0.5)
LIPASE SERPL-CCNC: 17 U/L (ref 4–60)
LYMPHOCYTES # BLD AUTO: 2.16 K/UL (ref 1–4.8)
MCH RBC QN AUTO: 33.1 PG (ref 27–31)
MCHC RBC AUTO-ENTMCNC: 32.9 G/DL (ref 32–36)
MCV RBC AUTO: 101 FL (ref 82–98)
NUCLEATED RBC (/100WBC) (OHS): 0 /100 WBC
PLATELET # BLD AUTO: 246 K/UL (ref 150–450)
PMV BLD AUTO: 8.5 FL (ref 9.2–12.9)
POTASSIUM SERPL-SCNC: 3.4 MMOL/L (ref 3.5–5.1)
PROT SERPL-MCNC: 6.9 GM/DL (ref 6–8.4)
RBC # BLD AUTO: 3.87 M/UL (ref 4.6–6.2)
RELATIVE EOSINOPHIL (OHS): 1.6 %
RELATIVE LYMPHOCYTE (OHS): 34.9 % (ref 18–48)
RELATIVE MONOCYTE (OHS): 8.1 % (ref 4–15)
RELATIVE NEUTROPHIL (OHS): 54.5 % (ref 38–73)
SODIUM SERPL-SCNC: 140 MMOL/L (ref 136–145)
TROPONIN I SERPL HS-MCNC: <3 NG/L
TROPONIN I SERPL HS-MCNC: <3 NG/L
WBC # BLD AUTO: 6.19 K/UL (ref 3.9–12.7)

## 2025-04-27 PROCEDURE — 93010 ELECTROCARDIOGRAM REPORT: CPT | Mod: ,,, | Performed by: INTERNAL MEDICINE

## 2025-04-27 PROCEDURE — 25000003 PHARM REV CODE 250: Performed by: EMERGENCY MEDICINE

## 2025-04-27 PROCEDURE — 93005 ELECTROCARDIOGRAM TRACING: CPT

## 2025-04-27 PROCEDURE — 86803 HEPATITIS C AB TEST: CPT | Performed by: PHYSICIAN ASSISTANT

## 2025-04-27 PROCEDURE — 63600175 PHARM REV CODE 636 W HCPCS: Performed by: STUDENT IN AN ORGANIZED HEALTH CARE EDUCATION/TRAINING PROGRAM

## 2025-04-27 PROCEDURE — 99285 EMERGENCY DEPT VISIT HI MDM: CPT | Mod: 25

## 2025-04-27 PROCEDURE — 87389 HIV-1 AG W/HIV-1&-2 AB AG IA: CPT | Performed by: PHYSICIAN ASSISTANT

## 2025-04-27 PROCEDURE — 84484 ASSAY OF TROPONIN QUANT: CPT | Performed by: EMERGENCY MEDICINE

## 2025-04-27 PROCEDURE — 83880 ASSAY OF NATRIURETIC PEPTIDE: CPT | Performed by: EMERGENCY MEDICINE

## 2025-04-27 PROCEDURE — 96374 THER/PROPH/DIAG INJ IV PUSH: CPT

## 2025-04-27 PROCEDURE — 85025 COMPLETE CBC W/AUTO DIFF WBC: CPT | Performed by: EMERGENCY MEDICINE

## 2025-04-27 PROCEDURE — 83690 ASSAY OF LIPASE: CPT | Performed by: EMERGENCY MEDICINE

## 2025-04-27 PROCEDURE — 80053 COMPREHEN METABOLIC PANEL: CPT | Performed by: EMERGENCY MEDICINE

## 2025-04-27 RX ORDER — NAPROXEN 500 MG/1
500 TABLET ORAL
Status: COMPLETED | OUTPATIENT
Start: 2025-04-27 | End: 2025-04-27

## 2025-04-27 RX ORDER — ONDANSETRON HYDROCHLORIDE 2 MG/ML
4 INJECTION, SOLUTION INTRAVENOUS
Status: COMPLETED | OUTPATIENT
Start: 2025-04-27 | End: 2025-04-27

## 2025-04-27 RX ADMIN — NAPROXEN 500 MG: 500 TABLET ORAL at 11:04

## 2025-04-27 RX ADMIN — ONDANSETRON 4 MG: 2 INJECTION INTRAMUSCULAR; INTRAVENOUS at 10:04

## 2025-04-27 NOTE — ED PROVIDER NOTES
Encounter Date: 4/27/2025       History     Chief Complaint   Patient presents with    Chest Pain     Chest pain starting around 0800. 324mg ASA given PTA. +nausea.     HPI  Dillon Wilhelm is a 44 YOM with past medical history significant for bronchitis, GERD, and irregular heartbeat is presenting with chest pain.  Chest pain started around 0500. Feels like a squeezing or spasm. Worse with activity or deep breathes. Radiates into neck, back and LUE. No light headedness or dizziness. Has chronic productive cough that doesn't seem worse.  Denies shortness of breath but does feel like breathing deeply increases the pain.  Had some nausea during the time of onset but now has resolved.  Denies any lightheadedness or dizziness.  Denies abdominal pain  No other acute concerns at this time    On chart review patient was seen 06/16/2023 for a similar complaint.  During this encounter patient was admitted to ED observation overnight for further evaluation management.  During his stay he is seen by Cardiology.  Patient received echo which revealed normal ventricular size and function as well as a mildly thickened valve along with pulmonary hypertension.  He had 3- troponins.  He is cleared by Cardiology to follow up in clinic.  Patient was most recently seen by cardiology 12/11/2023.  Based on their evaluation they believe that this is most likely a musculoskeletal call that has not resolved at the time of that appointment.  He does have a history of pulmonary hypertension they suspect is related to his smoking history versus hypertension.  Had planned for a repeat echo.    Review of patient's allergies indicates:  No Known Allergies  Past Medical History:   Diagnosis Date    Bronchitis     GERD (gastroesophageal reflux disease)     Irregular heart beat      History reviewed. No pertinent surgical history.  No family history on file.  Social History[1]  Review of Systems    Physical Exam     Initial Vitals [04/27/25 0849]   BP  Pulse Resp Temp SpO2   (!) 122/93 76 16 97.4 °F (36.3 °C) 97 %      MAP       --         Physical Exam    Nursing note and vitals reviewed.  Constitutional: He appears well-developed and well-nourished.   HENT:   Head: Normocephalic and atraumatic.   Eyes: EOM are normal. Pupils are equal, round, and reactive to light.   Cardiovascular:  Normal rate, regular rhythm, normal heart sounds and intact distal pulses.           No murmur heard.  Pulmonary/Chest: No respiratory distress. He has no wheezes. He has no rhonchi. He has no rales. He exhibits no tenderness.   Transmitted upper airway sounds that clear with coughing   Abdominal: Abdomen is soft. Bowel sounds are normal. He exhibits no distension. There is no abdominal tenderness.   Musculoskeletal:      Comments: Tender muscle spasm of L trapezius     Neurological: He is alert and oriented to person, place, and time.   Skin: Skin is warm. Capillary refill takes less than 2 seconds.   Psychiatric: He has a normal mood and affect.         ED Course   Procedures  Labs Reviewed   COMPREHENSIVE METABOLIC PANEL - Abnormal       Result Value    Sodium 140      Potassium 3.4 (*)     Chloride 106      CO2 24      Glucose 120 (*)     BUN 9      Creatinine 0.8      Calcium 8.6 (*)     Protein Total 6.9      Albumin 3.4 (*)     Bilirubin Total 1.1 (*)     ALP 85      AST 27      ALT 17      Anion Gap 10      eGFR >60     CBC WITH DIFFERENTIAL - Abnormal    WBC 6.19      RBC 3.87 (*)     HGB 12.8 (*)     HCT 38.9 (*)      (*)     MCH 33.1 (*)     MCHC 32.9      RDW 12.0      Platelet Count 246      MPV 8.5 (*)     Nucleated RBC 0      Neut % 54.5      Lymph % 34.9      Mono % 8.1      Eos % 1.6      Basophil % 0.6      Imm Grans % 0.3      Neut # 3.37      Lymph # 2.16      Mono # 0.50      Eos # 0.10      Baso # 0.04      Imm Grans # 0.02     HEPATITIS C ANTIBODY - Normal    Hep C Ab Interp Non-Reactive     HIV 1 / 2 ANTIBODY - Normal    HIV 1/2 Ag/Ab Non-Reactive      TROPONIN I HIGH SENSITIVITY - Normal    Troponin High Sensitive <3     B-TYPE NATRIURETIC PEPTIDE - Normal    BNP 31     LIPASE - Normal    Lipase Level 17     TROPONIN I HIGH SENSITIVITY - Normal    Troponin High Sensitive <3     CBC W/ AUTO DIFFERENTIAL    Narrative:     The following orders were created for panel order CBC auto differential.  Procedure                               Abnormality         Status                     ---------                               -----------         ------                     CBC with Differential[[id number]]       Abnormal            Final result                 Please view results for these tests on the individual orders.   HEP C VIRUS HOLD SPECIMEN          Imaging Results              X-Ray Chest AP Portable (Final result)  Result time 04/27/25 10:57:42      Final result by Robert Lo MD (04/27/25 10:57:42)                   Impression:      Clear lungs.      Electronically signed by: Robert Lo  Date:    04/27/2025  Time:    10:57               Narrative:    EXAMINATION:  XR CHEST AP PORTABLE    CLINICAL HISTORY:  Chest Pain;    TECHNIQUE:  Single frontal view of the chest was performed.    COMPARISON:  06/16/2023    FINDINGS:  No confluent consolidation or pulmonary edema.  Cardiac silhouette normal in size.  No blunting of costophrenic angles.  No pneumothorax.                                       Medications   ondansetron injection 4 mg (4 mg Intravenous Given 4/27/25 1031)   naproxen tablet 500 mg (500 mg Oral Given 4/27/25 1112)     Medical Decision Making  Dillon Wilhelm is a 44 YOM with past medical history significant for bronchitis, GERD, and irregular heartbeat is presenting with chest pain.  On arrival patient is hemodynamically stable.  Exam grossly unremarkable.  No chest wall tenderness, in fact patient reports improvement in his symptoms with application of pressure to his chest wall.  Differentials at this time include ACS, new heart failure  exacerbation-though less likely given patient does not show signs overload and previous echo had normal EF, cardiac arrhythmia-though less likely given EKG shows normal sinus rhythm, pneumonia, costochondritis, anxiety, GERD and to obtain CBC, CMP, troponin, BNP, chest x-ray, lipase.    Cardiac workup grossly reassuring.  Repeat exam with tender trapezius consistent with muscle strain/spasm.  Chest x-ray grossly normal.  At this time I believe his chest pain is likely musculoskeletal in nature.  Plan to discharge with naproxen and follow up with his PCP as needed.  Return precautions provided.    EKG shows normal sinus rhythm without STEMI.  Troponin is negative.  BNP is also negative.  Chest x-ray shows no acute cardiopulmonary findings.  Based on his workup and exam I believe that his pain is likely musculoskeletal in nature.  At this time would discharge home with naproxen and follow up with his PCP.  Patient was given a referral to family Medicine.  Return precautions provided.    Amount and/or Complexity of Data Reviewed  External Data Reviewed:      Details: === Results for orders placed during the hospital encounter of 06/16/23 ===    Echo Saline Bubble? No    - Interpretation Summary -  · The left ventricle is normal in size with normal systolic function.  · The estimated ejection fraction is 65%.  · Normal left ventricular diastolic function.  · Normal right ventricular size with normal right ventricular systolic function.  · There is mild pulmonary hypertension.  · The estimated PA systolic pressure is 46 mmHg.  · Normal central venous pressure (3 mmHg).      Labs: ordered. Decision-making details documented in ED Course.  Radiology: ordered. Decision-making details documented in ED Course.    Risk  Prescription drug management.               ED Course as of 04/27/25 1428   Sun Apr 27, 2025   0948 WBC: 6.19 [AC]   0948 Hemoglobin(!): 12.8 [AC]   1002 BNP: 31 [AC]   1002 Lipase: 17 [AC]   1002 Troponin I  High Sensitivity: <3 [AC]   1002 Potassium(!): 3.4 [AC]   1002 Hemoglobin(!): 12.8 [AC]   1116 X-Ray Chest AP Portable  Clear lungs. [AC]   1212 Troponin I High Sensitivity: <3 [AC]      ED Course User Index  [AC] Katarzyna Rodriguez MD                           Clinical Impression:  Final diagnoses:  [R07.9] Chest pain  [R07.89] Chest wall pain (Primary)          ED Disposition Condition    Discharge Good          ED Prescriptions    None       Follow-up Information       Follow up With Specialties Details Why Contact Info    [facility] - Emergency Dept Emergency Medicine   [address] [place] [id number] [phone]                 [1]   Social History  Tobacco Use    Smoking status: Every Day     Types: Cigarettes   Substance Use Topics    Alcohol use: Yes     Comment: socially     Drug use: Yes     Types: Marijuana        Katarzyna Rodriguez MD  Resident  04/27/25 8541

## 2025-04-27 NOTE — EKG INTERPRETATIONS - EMERGENCY DEPT.
Independently interpreted by MD:  Rate 85, NSR, no stemi, no ectopy, IRBBB, LVH, nonspecific ST and T wave changes

## 2025-04-27 NOTE — ED NOTES
The patient was brought to the ER today by [facility] ems #[id number] from work for c/o chest pain that began at 5am before going to work.

## 2025-04-27 NOTE — DISCHARGE INSTRUCTIONS
You were seen in the emergency department for your chest pain.  We believe that this is chest wall pain caused from muscular pain.  We did a workup and it does not appear like you are having heart attack.  That your labs are all normal.  We recommend that you take naproxen twice daily as needed to manage her pain.  You should follow up with your primary care doctor for further management of this pain.  It does not appear that you have a primary care doctor so a referral has been placed on your behalf.    Please return to the emergency department if you are having any new chest pain with difficulty breathing, fainting, weakness, or any new concern.

## 2025-04-28 LAB
OHS QRS DURATION: 104 MS
OHS QTC CALCULATION: 452 MS